# Patient Record
Sex: MALE | Race: WHITE | NOT HISPANIC OR LATINO
[De-identification: names, ages, dates, MRNs, and addresses within clinical notes are randomized per-mention and may not be internally consistent; named-entity substitution may affect disease eponyms.]

---

## 2017-09-07 ENCOUNTER — APPOINTMENT (OUTPATIENT)
Dept: SPINE | Facility: CLINIC | Age: 77
End: 2017-09-07
Payer: MEDICARE

## 2017-09-07 VITALS
DIASTOLIC BLOOD PRESSURE: 68 MMHG | HEIGHT: 66 IN | WEIGHT: 164 LBS | SYSTOLIC BLOOD PRESSURE: 124 MMHG | BODY MASS INDEX: 26.36 KG/M2

## 2017-09-07 DIAGNOSIS — Z86.39 PERSONAL HISTORY OF OTHER ENDOCRINE, NUTRITIONAL AND METABOLIC DISEASE: ICD-10-CM

## 2017-09-07 PROCEDURE — 99213 OFFICE O/P EST LOW 20 MIN: CPT

## 2018-08-02 ENCOUNTER — APPOINTMENT (OUTPATIENT)
Dept: SPINE | Facility: CLINIC | Age: 78
End: 2018-08-02
Payer: MEDICARE

## 2018-08-02 VITALS
BODY MASS INDEX: 26.36 KG/M2 | WEIGHT: 164 LBS | DIASTOLIC BLOOD PRESSURE: 70 MMHG | SYSTOLIC BLOOD PRESSURE: 124 MMHG | HEIGHT: 66 IN

## 2018-08-02 PROCEDURE — 99213 OFFICE O/P EST LOW 20 MIN: CPT

## 2019-08-08 ENCOUNTER — APPOINTMENT (OUTPATIENT)
Dept: SPINE | Facility: CLINIC | Age: 79
End: 2019-08-08

## 2021-05-12 VITALS
RESPIRATION RATE: 16 BRPM | DIASTOLIC BLOOD PRESSURE: 74 MMHG | HEART RATE: 51 BPM | SYSTOLIC BLOOD PRESSURE: 122 MMHG | BODY MASS INDEX: 26.68 KG/M2 | HEIGHT: 66 IN | WEIGHT: 166 LBS | OXYGEN SATURATION: 98 % | TEMPERATURE: 96.1 F

## 2021-06-29 ENCOUNTER — APPOINTMENT (OUTPATIENT)
Dept: INTERNAL MEDICINE | Facility: CLINIC | Age: 81
End: 2021-06-29

## 2021-06-29 ENCOUNTER — NON-APPOINTMENT (OUTPATIENT)
Age: 81
End: 2021-06-29

## 2021-06-29 DIAGNOSIS — M54.5 LOW BACK PAIN: ICD-10-CM

## 2021-06-29 DIAGNOSIS — R07.9 CHEST PAIN, UNSPECIFIED: ICD-10-CM

## 2021-06-29 DIAGNOSIS — Z86.010 PERSONAL HISTORY OF COLONIC POLYPS: ICD-10-CM

## 2021-06-29 DIAGNOSIS — I49.3 VENTRICULAR PREMATURE DEPOLARIZATION: ICD-10-CM

## 2021-06-29 DIAGNOSIS — Z87.891 PERSONAL HISTORY OF NICOTINE DEPENDENCE: ICD-10-CM

## 2021-06-29 RX ORDER — DONEPEZIL HYDROCHLORIDE 10 MG/1
10 TABLET, FILM COATED ORAL
Refills: 0 | Status: ACTIVE | COMMUNITY

## 2021-06-29 RX ORDER — QUETIAPINE FUMARATE 25 MG/1
25 TABLET ORAL
Refills: 0 | Status: ACTIVE | COMMUNITY

## 2021-06-29 RX ORDER — FOLIC ACID 1 MG/1
1 TABLET ORAL
Refills: 0 | Status: ACTIVE | COMMUNITY

## 2021-06-29 RX ORDER — METHYLPREDNISOLONE 4 MG/1
4 TABLET ORAL
Refills: 0 | Status: ACTIVE | COMMUNITY

## 2021-06-29 RX ORDER — MEMANTINE HYDROCHLORIDE 10 MG/1
10 TABLET, FILM COATED ORAL
Refills: 0 | Status: ACTIVE | COMMUNITY

## 2021-06-29 RX ORDER — SILDENAFIL CITRATE 50 MG/1
50 TABLET, FILM COATED ORAL
Refills: 0 | Status: ACTIVE | COMMUNITY

## 2021-06-29 RX ORDER — ERGOCALCIFEROL (VITAMIN D2) 1250 MCG
50000 CAPSULE ORAL
Refills: 0 | Status: ACTIVE | COMMUNITY

## 2021-07-02 ENCOUNTER — RX RENEWAL (OUTPATIENT)
Age: 81
End: 2021-07-02

## 2021-07-03 ENCOUNTER — RX RENEWAL (OUTPATIENT)
Age: 81
End: 2021-07-03

## 2021-07-07 ENCOUNTER — APPOINTMENT (OUTPATIENT)
Dept: INTERNAL MEDICINE | Facility: CLINIC | Age: 81
End: 2021-07-07

## 2021-07-16 ENCOUNTER — APPOINTMENT (OUTPATIENT)
Dept: INTERNAL MEDICINE | Facility: CLINIC | Age: 81
End: 2021-07-16
Payer: MEDICARE

## 2021-07-16 VITALS
HEART RATE: 52 BPM | SYSTOLIC BLOOD PRESSURE: 124 MMHG | OXYGEN SATURATION: 97 % | HEIGHT: 66 IN | BODY MASS INDEX: 24.75 KG/M2 | TEMPERATURE: 96 F | DIASTOLIC BLOOD PRESSURE: 82 MMHG | RESPIRATION RATE: 16 BRPM | WEIGHT: 154 LBS

## 2021-07-16 DIAGNOSIS — M25.561 PAIN IN RIGHT KNEE: ICD-10-CM

## 2021-07-16 DIAGNOSIS — M54.2 CERVICALGIA: ICD-10-CM

## 2021-07-16 PROCEDURE — 20610 DRAIN/INJ JOINT/BURSA W/O US: CPT

## 2021-07-16 PROCEDURE — 99214 OFFICE O/P EST MOD 30 MIN: CPT | Mod: 25

## 2021-07-16 PROCEDURE — 99072 ADDL SUPL MATRL&STAF TM PHE: CPT

## 2021-07-16 RX ORDER — LIDOCAINE HYDROCHLORIDE 10 MG/ML
1 INJECTION, SOLUTION INFILTRATION; PERINEURAL
Qty: 0 | Refills: 0 | Status: COMPLETED | OUTPATIENT
Start: 2021-07-16

## 2021-07-16 RX ORDER — TRIAMCINOLONE ACETONIDE 40 MG/ML
40 SUSPENSION INTRA-ARTERIAL; INTRAMUSCULAR
Qty: 1 | Refills: 0 | Status: COMPLETED | OUTPATIENT
Start: 2021-07-16

## 2021-07-16 RX ADMIN — LIDOCAINE HYDROCHLORIDE 1 %: 10 INJECTION, SOLUTION INFILTRATION; PERINEURAL at 00:00

## 2021-07-16 RX ADMIN — TRIAMCINOLONE ACETONIDE 0 MG/ML: 40 INJECTION, SUSPENSION INTRA-ARTICULAR; INTRAMUSCULAR at 00:00

## 2021-07-18 NOTE — HISTORY OF PRESENT ILLNESS
[FreeTextEntry1] : GHM, f/u, htn, hypothyroid, depression, deafness, bipolar disorder meningioma, leaving for [place] [de-identified] : 81 y/o M presents for f/u, GHM. \par Hx of lipids, htn, hypothyroid, BPH, dementia and deafness. \par Of note, he received both doses of the covid vaccine.\par Pt is leaving for [place] on 7/28, returns Sept 1\par \par  He has hx of right inguinal hernia.  He denies fever, cough, body aches, chills and SOB. \par He denies any recent falls. Routine blood work ordered today.

## 2021-07-18 NOTE — PHYSICAL EXAM
[No Acute Distress] : no acute distress [Well Nourished] : well nourished [Well Developed] : well developed [Well-Appearing] : well-appearing [Normal Sclera/Conjunctiva] : normal sclera/conjunctiva [PERRL] : pupils equal round and reactive to light [EOMI] : extraocular movements intact [Normal Outer Ear/Nose] : the outer ears and nose were normal in appearance [Normal Oropharynx] : the oropharynx was normal [No JVD] : no jugular venous distention [No Lymphadenopathy] : no lymphadenopathy [Supple] : supple [Thyroid Normal, No Nodules] : the thyroid was normal and there were no nodules present [No Respiratory Distress] : no respiratory distress  [No Accessory Muscle Use] : no accessory muscle use [Clear to Auscultation] : lungs were clear to auscultation bilaterally [Normal Rate] : normal rate  [Regular Rhythm] : with a regular rhythm [Normal S1, S2] : normal S1 and S2 [No Murmur] : no murmur heard [No Carotid Bruits] : no carotid bruits [No Abdominal Bruit] : a ~M bruit was not heard ~T in the abdomen [No Varicosities] : no varicosities [Pedal Pulses Present] : the pedal pulses are present [No Edema] : there was no peripheral edema [No Palpable Aorta] : no palpable aorta [No Extremity Clubbing/Cyanosis] : no extremity clubbing/cyanosis [Soft] : abdomen soft [Non Tender] : non-tender [Non-distended] : non-distended [No Masses] : no abdominal mass palpated [No HSM] : no HSM [Normal Bowel Sounds] : normal bowel sounds [Normal Posterior Cervical Nodes] : no posterior cervical lymphadenopathy [Normal Anterior Cervical Nodes] : no anterior cervical lymphadenopathy [No CVA Tenderness] : no CVA  tenderness [No Spinal Tenderness] : no spinal tenderness [No Joint Swelling] : no joint swelling [Grossly Normal Strength/Tone] : grossly normal strength/tone [No Rash] : no rash [Coordination Grossly Intact] : coordination grossly intact [No Focal Deficits] : no focal deficits [Normal Gait] : normal gait [Deep Tendon Reflexes (DTR)] : deep tendon reflexes were 2+ and symmetric [Normal Affect] : the affect was normal [Normal Insight/Judgement] : insight and judgment were intact [de-identified] : Right knee tenderness decreased range of motion crepitation

## 2021-07-18 NOTE — ASSESSMENT
[FreeTextEntry1] : Symptomatic patients : Test for influenza, if positive, treat for influenza and do not continue below. \par 1. Fever plus cough or shortness of breath : Test for RVP and COVID-19.\par 2.Indirect, circumstantial or unclear exposure to COVID-19, or other concerning cases not meeting above criteria: Please call [facility] to discuss testing. \par +++ All above cases must be reported to the [facility] registry. +++\par \par Asymptomatic patients: \par 1. Known first-degree direct-contact exposure to positive COVID-19 patient but asymptomatic: No testing PLUS 14 day self-quarantine. Pt to call if symptoms develop. Report to [facility] Registry.\par 2. No known exposure and asymptomatic, referred from outside healthcare organization: Please call [facility] to discuss testing. \par 3.All other asymptomatic patients with no known exposures: no testing, no exceptions.\par \par

## 2021-07-23 ENCOUNTER — APPOINTMENT (OUTPATIENT)
Dept: INTERNAL MEDICINE | Facility: CLINIC | Age: 81
End: 2021-07-23
Payer: MEDICARE

## 2021-07-23 VITALS
HEIGHT: 66 IN | SYSTOLIC BLOOD PRESSURE: 130 MMHG | WEIGHT: 154 LBS | TEMPERATURE: 96 F | DIASTOLIC BLOOD PRESSURE: 78 MMHG | BODY MASS INDEX: 24.75 KG/M2 | RESPIRATION RATE: 16 BRPM | HEART RATE: 61 BPM | OXYGEN SATURATION: 98 %

## 2021-07-23 DIAGNOSIS — Z71.89 OTHER SPECIFIED COUNSELING: ICD-10-CM

## 2021-07-23 PROCEDURE — 99072 ADDL SUPL MATRL&STAF TM PHE: CPT

## 2021-07-23 PROCEDURE — 99213 OFFICE O/P EST LOW 20 MIN: CPT

## 2021-07-24 PROBLEM — Z71.89 COUNSELED ABOUT COVID-19 VIRUS INFECTION: Status: ACTIVE | Noted: 2021-07-16

## 2021-07-24 NOTE — HISTORY OF PRESENT ILLNESS
[de-identified] : 81 y/o M presents for f/u, [facility]. INquiry about foreign travel--> advising pt to return 72 hrs prior to flight.\par Needs med refill\par Hx of lipids, htn, hypothyroid, BPH, dementia and deafness. \par Of note, he received both doses of the covid vaccine.\par Pt is leaving for [place] on 7/28, returns Sept 1\par \par Pt is in need of Vitamin D, level was at 25 \par \par \par He has hx of right inguinal hernia.  He denies fever, cough, body aches, chills and SOB. \par He denies any recent falls. Routine blood work ordered today. [FreeTextEntry1] : questions about foreign travel, medication refill, GHM, f/u, htn, hypothyroid, depression, deafness, bipolar disorder meningioma, leaving for [place]

## 2021-07-25 ENCOUNTER — APPOINTMENT (OUTPATIENT)
Dept: INTERNAL MEDICINE | Facility: CLINIC | Age: 81
End: 2021-07-25
Payer: MEDICARE

## 2021-07-25 VITALS
DIASTOLIC BLOOD PRESSURE: 76 MMHG | RESPIRATION RATE: 17 BRPM | BODY MASS INDEX: 24.75 KG/M2 | HEART RATE: 51 BPM | SYSTOLIC BLOOD PRESSURE: 138 MMHG | TEMPERATURE: 96.6 F | OXYGEN SATURATION: 97 % | HEIGHT: 66 IN | WEIGHT: 154 LBS

## 2021-07-25 DIAGNOSIS — Z11.52 ENCOUNTER FOR SCREENING FOR COVID-19: ICD-10-CM

## 2021-07-25 PROCEDURE — 99072 ADDL SUPL MATRL&STAF TM PHE: CPT

## 2021-07-25 PROCEDURE — 99213 OFFICE O/P EST LOW 20 MIN: CPT

## 2021-07-25 NOTE — HISTORY OF PRESENT ILLNESS
[Spouse] : spouse [Other: _____] : [unfilled] [FreeTextEntry8] : Traveling to [facility], needs covid test\par Otherwise well, no complaints\par \par \par Spouse

## 2021-07-27 LAB — SARS-COV-2 N GENE NPH QL NAA+PROBE: NOT DETECTED

## 2021-09-08 ENCOUNTER — APPOINTMENT (OUTPATIENT)
Dept: INTERNAL MEDICINE | Facility: CLINIC | Age: 81
End: 2021-09-08
Payer: MEDICARE

## 2021-09-08 VITALS
OXYGEN SATURATION: 98 % | BODY MASS INDEX: 25.23 KG/M2 | TEMPERATURE: 97 F | WEIGHT: 157 LBS | DIASTOLIC BLOOD PRESSURE: 68 MMHG | HEIGHT: 66 IN | HEART RATE: 68 BPM | SYSTOLIC BLOOD PRESSURE: 108 MMHG | RESPIRATION RATE: 18 BRPM

## 2021-09-08 PROCEDURE — 90662 IIV NO PRSV INCREASED AG IM: CPT

## 2021-09-08 PROCEDURE — 99214 OFFICE O/P EST MOD 30 MIN: CPT | Mod: 25

## 2021-09-08 PROCEDURE — G0008: CPT

## 2021-09-10 ENCOUNTER — RX RENEWAL (OUTPATIENT)
Age: 81
End: 2021-09-10

## 2021-09-12 NOTE — HISTORY OF PRESENT ILLNESS
[FreeTextEntry1] : Follow up, [facility] hx of htn, hypothyroid, BPH, dementia and deafness.  [de-identified] : 81 y/o M presents for f/u, GHM.\par \par Hx of lipids, htn, hypothyroid, BPH, dementia and deafness. \par Of note, he received both doses of the covid vaccine.\par Patient is advised to get a booster shot for his covid vaccine in December of 2021/ \par \par Patient is referred to a general surgeon for hernias Dr. Singletary. Patient is complaining of left lower quadrant pain. \par \par Pt is in need of Vitamin D, level was at 25 \par \par \par He has hx of right inguinal hernia.  He denies fever, cough, body aches, chills and SOB. \par He denies any recent falls. Routine blood work ordered today.

## 2021-09-23 ENCOUNTER — APPOINTMENT (OUTPATIENT)
Dept: SURGERY | Facility: CLINIC | Age: 81
End: 2021-09-23
Payer: MEDICARE

## 2021-09-23 VITALS
HEART RATE: 54 BPM | DIASTOLIC BLOOD PRESSURE: 69 MMHG | RESPIRATION RATE: 18 BRPM | BODY MASS INDEX: 24.27 KG/M2 | HEIGHT: 66 IN | SYSTOLIC BLOOD PRESSURE: 129 MMHG | TEMPERATURE: 97.4 F | OXYGEN SATURATION: 96 % | WEIGHT: 151 LBS

## 2021-09-23 DIAGNOSIS — K40.90 UNILATERAL INGUINAL HERNIA, W/OUT OBSTRUCTION OR GANGRENE, NOT SPECIFIED AS RECURRENT: ICD-10-CM

## 2021-09-23 PROCEDURE — 99203 OFFICE O/P NEW LOW 30 MIN: CPT

## 2021-09-23 NOTE — PHYSICAL EXAM
[Normal Breath Sounds] : Normal breath sounds [Alert] : alert [Oriented to Person] : oriented to person [Oriented to Place] : oriented to place [Oriented to Time] : oriented to time [Calm] : calm [de-identified] : well appearing  [de-identified] : reducible left inguinal hernia

## 2021-09-23 NOTE — HISTORY OF PRESENT ILLNESS
[de-identified] : 81 yo male who presents to the office for evaluation of  left inguinal hernia. He states that he has been feeling some discomfort in left groin and came in for evaluation. He denied any obstructive symptoms associated with it.

## 2021-09-23 NOTE — ASSESSMENT
[FreeTextEntry1] : 81 yo male with reducible left inguinal hernia. \par Operative options(open vs robotic approach) was discussed as well as watchful waiting. He will like to proceed with watchful waiting. He and his wife was instructed to come back if had worsening in his symptoms.  All questions were answered in detail and the patient expressed full understanding.

## 2021-11-22 ENCOUNTER — APPOINTMENT (OUTPATIENT)
Dept: INTERNAL MEDICINE | Facility: CLINIC | Age: 81
End: 2021-11-22

## 2021-12-04 ENCOUNTER — RX RENEWAL (OUTPATIENT)
Age: 81
End: 2021-12-04

## 2021-12-15 ENCOUNTER — APPOINTMENT (OUTPATIENT)
Dept: INTERNAL MEDICINE | Facility: CLINIC | Age: 81
End: 2021-12-15
Payer: MEDICARE

## 2021-12-15 VITALS
HEART RATE: 60 BPM | BODY MASS INDEX: 25.07 KG/M2 | TEMPERATURE: 96.3 F | SYSTOLIC BLOOD PRESSURE: 116 MMHG | DIASTOLIC BLOOD PRESSURE: 60 MMHG | WEIGHT: 156 LBS | OXYGEN SATURATION: 98 % | RESPIRATION RATE: 18 BRPM | HEIGHT: 66 IN

## 2021-12-15 DIAGNOSIS — H81.10 BENIGN PAROXYSMAL VERTIGO, UNSPECIFIED EAR: ICD-10-CM

## 2021-12-15 DIAGNOSIS — H69.90 UNSPECIFIED EUSTACHIAN TUBE DISORDER, UNSPECIFIED EAR: ICD-10-CM

## 2021-12-15 PROCEDURE — 99213 OFFICE O/P EST LOW 20 MIN: CPT

## 2021-12-15 RX ORDER — MECLIZINE HYDROCHLORIDE 25 MG/1
25 TABLET ORAL 3 TIMES DAILY
Qty: 21 | Refills: 1 | Status: ACTIVE | COMMUNITY
Start: 2021-12-15 | End: 1900-01-01

## 2021-12-16 PROBLEM — H69.90 ET (EUSTACHIAN TUBE DISORDER): Status: ACTIVE | Noted: 2021-12-16

## 2021-12-16 PROBLEM — H81.10 BENIGN POSITIONAL VERTIGO: Status: ACTIVE | Noted: 2021-12-15

## 2021-12-16 NOTE — PHYSICAL EXAM
[No Acute Distress] : no acute distress [Well Nourished] : well nourished [Well Developed] : well developed [Well-Appearing] : well-appearing [Normal Sclera/Conjunctiva] : normal sclera/conjunctiva [PERRL] : pupils equal round and reactive to light [EOMI] : extraocular movements intact [Normal Outer Ear/Nose] : the outer ears and nose were normal in appearance [Normal Oropharynx] : the oropharynx was normal [No JVD] : no jugular venous distention [No Lymphadenopathy] : no lymphadenopathy [Supple] : supple [Thyroid Normal, No Nodules] : the thyroid was normal and there were no nodules present [No Respiratory Distress] : no respiratory distress  [No Accessory Muscle Use] : no accessory muscle use [Clear to Auscultation] : lungs were clear to auscultation bilaterally [Normal Rate] : normal rate  [Regular Rhythm] : with a regular rhythm [Normal S1, S2] : normal S1 and S2 [No Murmur] : no murmur heard [No Carotid Bruits] : no carotid bruits [No Abdominal Bruit] : a ~M bruit was not heard ~T in the abdomen [No Varicosities] : no varicosities [Pedal Pulses Present] : the pedal pulses are present [No Edema] : there was no peripheral edema [No Palpable Aorta] : no palpable aorta [No Extremity Clubbing/Cyanosis] : no extremity clubbing/cyanosis [Soft] : abdomen soft [Non Tender] : non-tender [Non-distended] : non-distended [No Masses] : no abdominal mass palpated [No HSM] : no HSM [Normal Bowel Sounds] : normal bowel sounds [Normal Posterior Cervical Nodes] : no posterior cervical lymphadenopathy [Normal Anterior Cervical Nodes] : no anterior cervical lymphadenopathy [No CVA Tenderness] : no CVA  tenderness [No Spinal Tenderness] : no spinal tenderness [No Joint Swelling] : no joint swelling [Grossly Normal Strength/Tone] : grossly normal strength/tone [No Rash] : no rash [Coordination Grossly Intact] : coordination grossly intact [No Focal Deficits] : no focal deficits [Normal Gait] : normal gait [Deep Tendon Reflexes (DTR)] : deep tendon reflexes were 2+ and symmetric [Normal Affect] : the affect was normal [Normal Insight/Judgement] : insight and judgment were intact [de-identified] : Fluid in both ears and congested.  [de-identified] : Hallpike maneuver negative

## 2021-12-16 NOTE — HISTORY OF PRESENT ILLNESS
[FreeTextEntry1] : Follow up, [facility] hx of htn, hypothyroid, BPH, dementia and deafness. Patient is here today because this morning he felt dizzy.  Patient admits to having a URI this week [de-identified] : 80 y/o M presents for f/u, GHM.\par Hx of lipids, htn, hypothyroid, BPH, dementia and deafness. \par \par Patient is here today because this morning he felt dizzy \par Will be given meclizine 25mg and amoxicillin for the URI 500mg three times a day for a week. \par Of note, he received both doses of the covid vaccine.\par Otherwise patient is stable. \par \par Voices no further complaints.\par ROS as documented below.\par He denies fever, cough, body aches, chills and SOB.

## 2022-02-05 ENCOUNTER — RX RENEWAL (OUTPATIENT)
Age: 82
End: 2022-02-05

## 2022-03-10 ENCOUNTER — RX RENEWAL (OUTPATIENT)
Age: 82
End: 2022-03-10

## 2022-05-21 ENCOUNTER — RX RENEWAL (OUTPATIENT)
Age: 82
End: 2022-05-21

## 2022-07-19 ENCOUNTER — RX RENEWAL (OUTPATIENT)
Age: 82
End: 2022-07-19

## 2022-07-20 ENCOUNTER — APPOINTMENT (OUTPATIENT)
Dept: INTERNAL MEDICINE | Facility: CLINIC | Age: 82
End: 2022-07-20

## 2022-07-22 ENCOUNTER — NON-APPOINTMENT (OUTPATIENT)
Age: 82
End: 2022-07-22

## 2022-07-22 ENCOUNTER — APPOINTMENT (OUTPATIENT)
Dept: INTERNAL MEDICINE | Facility: CLINIC | Age: 82
End: 2022-07-22

## 2022-07-22 VITALS
HEIGHT: 66 IN | DIASTOLIC BLOOD PRESSURE: 80 MMHG | TEMPERATURE: 97.6 F | HEART RATE: 64 BPM | RESPIRATION RATE: 18 BRPM | BODY MASS INDEX: 26.52 KG/M2 | OXYGEN SATURATION: 97 % | SYSTOLIC BLOOD PRESSURE: 116 MMHG | WEIGHT: 165 LBS

## 2022-07-22 DIAGNOSIS — Z00.00 ENCOUNTER FOR GENERAL ADULT MEDICAL EXAMINATION W/OUT ABNORMAL FINDINGS: ICD-10-CM

## 2022-07-22 PROCEDURE — G0439: CPT

## 2022-07-22 PROCEDURE — 93000 ELECTROCARDIOGRAM COMPLETE: CPT | Mod: 59

## 2022-07-22 PROCEDURE — 99497 ADVNCD CARE PLAN 30 MIN: CPT

## 2022-07-22 PROCEDURE — G0444 DEPRESSION SCREEN ANNUAL: CPT | Mod: 59

## 2022-07-22 PROCEDURE — 36415 COLL VENOUS BLD VENIPUNCTURE: CPT

## 2022-07-22 RX ORDER — NIRMATRELVIR AND RITONAVIR 300-100 MG
20 X 150 MG & KIT ORAL
Qty: 1 | Refills: 0 | Status: ACTIVE | COMMUNITY
Start: 2022-07-22 | End: 1900-01-01

## 2022-07-24 LAB
25(OH)D3 SERPL-MCNC: 26.7 NG/ML
ALBUMIN SERPL ELPH-MCNC: 4.6 G/DL
ALP BLD-CCNC: 57 U/L
ALT SERPL-CCNC: 7 U/L
ANION GAP SERPL CALC-SCNC: 11 MMOL/L
APPEARANCE: CLEAR
AST SERPL-CCNC: 18 U/L
BASOPHILS # BLD AUTO: 0.13 K/UL
BASOPHILS NFR BLD AUTO: 1.5 %
BILIRUB SERPL-MCNC: 0.6 MG/DL
BILIRUBIN URINE: NEGATIVE
BLOOD URINE: NEGATIVE
BUN SERPL-MCNC: 15 MG/DL
CALCIUM SERPL-MCNC: 9.5 MG/DL
CHLORIDE SERPL-SCNC: 104 MMOL/L
CHOLEST SERPL-MCNC: 216 MG/DL
CO2 SERPL-SCNC: 26 MMOL/L
COLOR: YELLOW
CREAT SERPL-MCNC: 1.09 MG/DL
EGFR: 68 ML/MIN/1.73M2
EOSINOPHIL # BLD AUTO: 0.9 K/UL
EOSINOPHIL NFR BLD AUTO: 10.5 %
ESTIMATED AVERAGE GLUCOSE: 103 MG/DL
GGT SERPL-CCNC: 23 U/L
GLUCOSE QUALITATIVE U: NEGATIVE
GLUCOSE SERPL-MCNC: 81 MG/DL
HBA1C MFR BLD HPLC: 5.2 %
HCT VFR BLD CALC: 49.2 %
HDLC SERPL-MCNC: 59 MG/DL
HGB BLD-MCNC: 15.5 G/DL
IMM GRANULOCYTES NFR BLD AUTO: 0.2 %
KETONES URINE: NEGATIVE
LDLC SERPL CALC-MCNC: 133 MG/DL
LEUKOCYTE ESTERASE URINE: NEGATIVE
LYMPHOCYTES # BLD AUTO: 2.13 K/UL
LYMPHOCYTES NFR BLD AUTO: 24.9 %
MAN DIFF?: NORMAL
MCHC RBC-ENTMCNC: 29.9 PG
MCHC RBC-ENTMCNC: 31.5 GM/DL
MCV RBC AUTO: 95 FL
MONOCYTES # BLD AUTO: 0.71 K/UL
MONOCYTES NFR BLD AUTO: 8.3 %
NEUTROPHILS # BLD AUTO: 4.65 K/UL
NEUTROPHILS NFR BLD AUTO: 54.6 %
NITRITE URINE: NEGATIVE
NONHDLC SERPL-MCNC: 157 MG/DL
PH URINE: 6
PLATELET # BLD AUTO: 271 K/UL
POTASSIUM SERPL-SCNC: 4.5 MMOL/L
PROT SERPL-MCNC: 7 G/DL
PROTEIN URINE: NORMAL
PSA SERPL-MCNC: 3.9 NG/ML
RBC # BLD: 5.18 M/UL
RBC # FLD: 13.2 %
SODIUM SERPL-SCNC: 141 MMOL/L
SPECIFIC GRAVITY URINE: 1.02
TRIGL SERPL-MCNC: 118 MG/DL
TSH SERPL-ACNC: 0.72 UIU/ML
UROBILINOGEN URINE: NORMAL
WBC # FLD AUTO: 8.54 K/UL

## 2022-07-24 NOTE — ASSESSMENT
[FreeTextEntry1] : Medical Annual wellness visit completed:\par HRA completed and reviewed with patient\par Medical, family, surgical history reviewed with patient and updated\par List of current providers r/w patient and updated\par Vitals, BMI reviewed and discussed along with healthy BMI goals. Dietary counseling x 15 minutes provided\par Depression PHQ 9 completed and reviewed \par Annual safety assessment reviewed\par discussed advanced directives\par smoking cessation counseling provided\par Established routine screening and immunization schedules\par \par VACCINATION & OTHER TX RECOMMENDATIONS\par \par ASA preventative therapy\par Calcium/Vitamin D supplementation\par  \par Dietary counseling, nutrition referral\par risks vs. benefits d/w patient. routine vaccination and vaccination schedules and recommendation d/w patient\par \par Vaccines recommended: \par * pneumovax (once after 65) & prevnar\par * annual Influenza vaccine\par * Hep B vaccines\par * zostavax\par * Tdap\par \par Colorectal screening recommended; screening colonoscopy q10yr, flex sig q5yr, annual fecal occult testing\par BMD recommended biennially for osteoporosis screening\par Glaucoma screening recommended, annual optho evals\par Cardiovascular screening and blood tests recommended and discussed w/ patient, cholesterol screening and dietary counseling\par AAA recommended x 1\par \par \par Met with LAURA MANZANO, who was willing to discuss advance care planning. \par Our advance care planning conversation included a discussion about:\par 1. The value and importance of advance care planning.\par 2. Experiences with loved ones who have been seriously ill or have .\par 3. Exploration of personal, cultural, or spiritual beliefs that might influence medical decisions.\par 4. Exploration of goals of care in the event of a sudden injury or illness.\par 5. Identification of a health care agent.\par 6. Review and update, or completion of, an advance directive.\par Start time: 10 End time: 1015\par \par diet and exercise weight loss.  Low-salt low-fat ADA diet/ htn- Discussed diabetes physiology\par - Discussed importance of monitoring blood glucose levels\par - Encouraged a low fat/low cholesterol diet\par - Discussed symptoms of hyperglycemia and hypoglycemia\par - Discussed ADA glucose goals\par - Discussed  HGB A1c and the effects of blood glucose on the level\par - Discussed Healthy eating, avoidance of concentrated sweets, and to include vegetables by at least 2 meals a day\par - Discussed regular exercise\par - Discussed importance of follow up physician visits Limit intake of Sodium (Salt) to less than 2 grams a day to prevent fluid retention-swelling or worsening of symptoms. The importance of keeping the blood pressure at or below 130/80 to prevent stroke, heart attacks, kidney failure, blindness, and loss of limbs was  low chol diet. Avoid fried foods, red meat, butter, eggs, hard cheeses. Use canola or olive oil preferred. ::  was established in which goals would be set, monitoring would be done, and problem solving would also be addressed. The patient would be assisted using behavior change techniques, such as self-help and counseling through behavioral modification: Problem solving using hypnosis and positive medical reinforcement to achieve agreed-upon goals.\par \par Symptomatic patients : Test for influenza, if positive, treat for influenza and do not continue below. \par 1. Fever plus cough or shortness of breath : Test for RVP and COVID-19.\par 2.Indirect, circumstantial or unclear exposure to COVID-19, or other concerning cases not meeting above criteria: Please call [facility] to discuss testing. \par +++ All above cases must be reported to the [facility] registry. +++\par \par Asymptomatic patients: \par 1. Known first-degree direct-contact exposure to positive COVID-19 patient but asymptomatic: No testing PLUS 14 day self-quarantine. Pt to call if symptoms develop. Report to [facility] Registry.\par 2. No known exposure and asymptomatic, referred from outside healthcare organization: Please call [facility] to discuss testing. \par 3.All other asymptomatic patients with no known exposures: no testing, no exceptions.\par \par \par

## 2022-07-24 NOTE — HISTORY OF PRESENT ILLNESS
[FreeTextEntry1] : annual wellness, GHM, f/u, htn, hypothyroid, depression, deafness, bipolar disorder meningioma\par cc: mild sore throat, congestion [de-identified] : 82 y/o M presents for f/u, [facility] annual wellness\par Hx of lipids, htn, hypothyroid, BPH, dementia and deafness. \par cc: mild sore throat, congestion\par \par Of note, he received both doses of the covid vaccine.\par Otherwise patient is stable. \par Voices no further complaints.\par ROS as documented below.\par He denies fever, cough, body aches, chills and SOB.

## 2022-07-24 NOTE — HEALTH RISK ASSESSMENT
[Former] : Former [1 or 2 (0 pts)] : 1 or 2 (0 points) [Never (0 pts)] : Never (0 points) [No] : In the past 12 months have you used drugs other than those required for medical reasons? No [No falls in past year] : Patient reported no falls in the past year [I have developed a follow-up plan documented below in the note.] : I have developed a follow-up plan documented below in the note. [Patient declined colonoscopy] : Patient declined colonoscopy [Learning/Retaining New Information] : difficulty learning/retaining new information [Handling Complex Tasks] : difficulty handling complex tasks [Reasoning] : difficulty with reasoning [Spatial Ability and Orientation] : difficulty with spatial ability and orientation [None] : None [With Significant Other] : lives with significant other [Retired] : retired [High School] : high school [] :  [Feels Safe at Home] : Feels safe at home [Fully functional (bathing, dressing, toileting, transferring, walking, feeding)] : Fully functional (bathing, dressing, toileting, transferring, walking, feeding) [Fully functional (using the telephone, shopping, preparing meals, housekeeping, doing laundry, using] : Fully functional and needs no help or supervision to perform IADLs (using the telephone, shopping, preparing meals, housekeeping, doing laundry, using transportation, managing medications and managing finances) [Reports normal functional visual acuity (ie: able to read med bottle)] : Reports normal functional visual acuity [Smoke Detector] : smoke detector [Carbon Monoxide Detector] : carbon monoxide detector [Safety elements used in home] : safety elements used in home [Seat Belt] :  uses seat belt [Sunscreen] : uses sunscreen [With Patient/Caregiver] : , with patient/caregiver [Designated Healthcare Proxy] : Designated healthcare proxy [Name: ___] : Health Care Proxy's Name: [unfilled]  [Relationship: ___] : Relationship: [unfilled] [Last Verification Date: ___] : Last Verification Date: [unfilled] [I will adhere to the patient's wishes.] : I will adhere to the patient's wishes. [Time Spent: ___ minutes] : Time Spent: [unfilled] minutes [Fair] :  ~his/her~ mood as fair [1] : 2) Feeling down, depressed, or hopeless for several days (1) [PHQ-2 Positive] : PHQ-2 Positive [Several Days (1)] : 6.) Feeling bad about yourself, or that you are a failure, or have let yourself or your family down? Several days [Not at All (0)] : 8.) Moving or speaking so slowly that other people could have noticed, or the opposite, moving or speaking faster than usual? Not at all [Mild] : severity of depression is mild [PHQ-9 Positive] : PHQ-9 Positive [Reviewed no changes] : Reviewed, no changes [Aggressive treatment] : aggressive treatment [YearQuit] : 1989 [de-identified] : Walking [de-identified] : Standard [[id number]] : 2 [[id number]] : 3 [Change in mental status noted] : No change in mental status noted [Language] : denies difficulty with language [Behavior] : denies difficulty with behavior [Sexually Active] : not sexually active [High Risk Behavior] : no high risk behavior [Reports changes in hearing] : Reports no changes in hearing [Reports changes in vision] : Reports no changes in vision [Reports changes in dental health] : Reports no changes in dental health [Guns at Home] : no guns at home [Travel to Developing Areas] : does not  travel to developing areas [TB Exposure] : is not being exposed to tuberculosis [Caregiver Concerns] : does not have caregiver concerns [AdvancecareDate] : 07/2022

## 2022-07-24 NOTE — COUNSELING
[Fall prevention counseling provided] : Fall prevention counseling provided [Adequate lighting] : Adequate lighting [No throw rugs] : No throw rugs [Use proper foot wear] : Use proper foot wear [Use recommended devices] : Use recommended devices [Behavioral health counseling provided] : Behavioral health counseling provided [Sleep ___ hours/day] : Sleep [unfilled] hours/day [Engage in a relaxing activity] : Engage in a relaxing activity [Plan in advance] : Plan in advance [None] : None [Good understanding] : Patient has a good understanding of lifestyle changes and steps needed to achieve self management goal [de-identified] : Symptomatic patients : Test for influenza, if positive, treat for influenza and do not continue below. \par 1. Fever plus cough or shortness of breath : Test for RVP and COVID-19.\par 2.Indirect, circumstantial or unclear exposure to COVID-19, or other concerning cases not meeting above criteria: Please call [facility] to discuss testing. \par +++ All above cases must be reported to the [facility] registry. +++\par \par Asymptomatic patients: \par 1. Known first-degree direct-contact exposure to positive COVID-19 patient but asymptomatic: No testing PLUS 14 day self-quarantine. Pt to call if symptoms develop. Report to [facility] Registry.\par 2. No known exposure and asymptomatic, referred from outside healthcare organization: Please call [facility] to discuss testing. \par 3.All other asymptomatic patients with no known exposures: no testing, no exceptions.\par \par diet and exercise weight loss.  Low-salt low-fat ADA diet/ htn- Discussed diabetes physiology\par - Discussed importance of monitoring blood glucose levels\par - Encouraged a low fat/low cholesterol diet\par - Discussed symptoms of hyperglycemia and hypoglycemia\par - Discussed ADA glucose goals\par - Discussed  HGB A1c and the effects of blood glucose on the level\par - Discussed Healthy eating, avoidance of concentrated sweets, and to include vegetables by at least 2 meals a day\par - Discussed regular exercise\par - Discussed importance of follow up physician visits Limit intake of Sodium (Salt) to less than 2 grams a day to prevent fluid retention-swelling or worsening of symptoms. The importance of keeping the blood pressure at or below 130/80 to prevent stroke, heart attacks, kidney failure, blindness, and loss of limbs was  low chol diet. Avoid fried foods, red meat, butter, eggs, hard cheeses. Use canola or olive oil preferred. ::  was established in which goals would be set, monitoring would be done, and problem solving would also be addressed. The patient would be assisted using behavior change techniques, such as self-help and counseling through behavioral modification: Problem solving using hypnosis and positive medical reinforcement to achieve agreed-upon goals.\par Medical Annual wellness visit completed:\par HRA completed and reviewed with patient\par Medical, family, surgical history reviewed with patient and updated\par List of current providers r/w patient and updated\par Vitals, BMI reviewed and discussed along with healthy BMI goals. Dietary counseling x 15 minutes provided\par Depression PHQ 9 completed and reviewed \par Annual safety assessment reviewed\par discussed advanced directives\par smoking cessation counseling provided\par Established routine screening and immunization schedules\par Medical Annual wellness visit completed:\par HRA completed and reviewed with patient\par Medical, family, surgical history reviewed with patient and updated\par List of current providers r/w patient and updated\par Vitals, BMI reviewed and discussed along with healthy BMI goals. Dietary counseling x 15 minutes provided\par Depression PHQ 9 completed and reviewed \par Annual safety assessment reviewed\par discussed advanced directives\par smoking cessation counseling provided\par Established routine screening and immunization schedules\par \par VACCINATION & OTHER TX RECOMMENDATIONS\par \par ASA preventative therapy\par Calcium/Vitamin D supplementation\par  \par Dietary counseling, nutrition referral\par risks vs. benefits d/w patient. routine vaccination and vaccination schedules and recommendation d/w patient\par \par Vaccines recommended: \par * pneumovax (once after 65) & prevnar\par * annual Influenza vaccine\par * Hep B vaccines\par * zostavax\par * Tdap\par \par Colorectal screening recommended; screening colonoscopy q10yr, flex sig q5yr, annual fecal occult testing\par BMD recommended biennially for osteoporosis screening\par Glaucoma screening recommended, annual optho evals\par Cardiovascular screening and blood tests recommended and discussed w/ patient, cholesterol screening and dietary counseling\par AAA recommended x 1\par \par

## 2022-07-24 NOTE — PHYSICAL EXAM
[No Acute Distress] : no acute distress [Well Nourished] : well nourished [Well Developed] : well developed [Well-Appearing] : well-appearing [Normal Sclera/Conjunctiva] : normal sclera/conjunctiva [PERRL] : pupils equal round and reactive to light [EOMI] : extraocular movements intact [Normal Outer Ear/Nose] : the outer ears and nose were normal in appearance [No JVD] : no jugular venous distention [No Lymphadenopathy] : no lymphadenopathy [Supple] : supple [Thyroid Normal, No Nodules] : the thyroid was normal and there were no nodules present [No Respiratory Distress] : no respiratory distress  [No Accessory Muscle Use] : no accessory muscle use [Clear to Auscultation] : lungs were clear to auscultation bilaterally [Normal Rate] : normal rate  [Regular Rhythm] : with a regular rhythm [Normal S1, S2] : normal S1 and S2 [No Murmur] : no murmur heard [No Carotid Bruits] : no carotid bruits [No Abdominal Bruit] : a ~M bruit was not heard ~T in the abdomen [No Varicosities] : no varicosities [Pedal Pulses Present] : the pedal pulses are present [No Edema] : there was no peripheral edema [No Palpable Aorta] : no palpable aorta [No Extremity Clubbing/Cyanosis] : no extremity clubbing/cyanosis [Soft] : abdomen soft [Non Tender] : non-tender [Non-distended] : non-distended [No Masses] : no abdominal mass palpated [No HSM] : no HSM [Normal Bowel Sounds] : normal bowel sounds [Normal Posterior Cervical Nodes] : no posterior cervical lymphadenopathy [Normal Anterior Cervical Nodes] : no anterior cervical lymphadenopathy [No CVA Tenderness] : no CVA  tenderness [No Spinal Tenderness] : no spinal tenderness [No Joint Swelling] : no joint swelling [Grossly Normal Strength/Tone] : grossly normal strength/tone [No Rash] : no rash [Coordination Grossly Intact] : coordination grossly intact [No Focal Deficits] : no focal deficits [Normal Gait] : normal gait [Deep Tendon Reflexes (DTR)] : deep tendon reflexes were 2+ and symmetric [Normal Affect] : the affect was normal [Normal Insight/Judgement] : insight and judgment were intact [Alert and Oriented x3] : oriented to person, place, and time [de-identified] : mild pharyngeal erythema, severe deafness [de-identified] : Back pain

## 2022-07-25 ENCOUNTER — NON-APPOINTMENT (OUTPATIENT)
Age: 82
End: 2022-07-25

## 2022-10-10 ENCOUNTER — RX RENEWAL (OUTPATIENT)
Age: 82
End: 2022-10-10

## 2022-12-02 ENCOUNTER — RX RENEWAL (OUTPATIENT)
Age: 82
End: 2022-12-02

## 2022-12-02 RX ORDER — DICLOFENAC SODIUM 100 MG/1
100 TABLET, FILM COATED, EXTENDED RELEASE ORAL
Qty: 30 | Refills: 3 | Status: ACTIVE | COMMUNITY
Start: 2021-07-23 | End: 1900-01-01

## 2022-12-02 RX ORDER — ACETAMINOPHEN AND CODEINE 300; 30 MG/1; MG/1
300-30 TABLET ORAL
Qty: 60 | Refills: 0 | Status: ACTIVE | COMMUNITY
Start: 2022-12-02 | End: 1900-01-01

## 2022-12-05 ENCOUNTER — APPOINTMENT (OUTPATIENT)
Dept: INTERNAL MEDICINE | Facility: CLINIC | Age: 82
End: 2022-12-05

## 2022-12-05 VITALS
DIASTOLIC BLOOD PRESSURE: 70 MMHG | OXYGEN SATURATION: 96 % | SYSTOLIC BLOOD PRESSURE: 130 MMHG | BODY MASS INDEX: 26.52 KG/M2 | HEIGHT: 66 IN | TEMPERATURE: 97.5 F | RESPIRATION RATE: 18 BRPM | WEIGHT: 165 LBS | HEART RATE: 55 BPM

## 2022-12-05 DIAGNOSIS — E66.3 OVERWEIGHT: ICD-10-CM

## 2022-12-05 DIAGNOSIS — R19.8 OTHER SPECIFIED SYMPTOMS AND SIGNS INVOLVING THE DIGESTIVE SYSTEM AND ABDOMEN: ICD-10-CM

## 2022-12-05 PROCEDURE — 99214 OFFICE O/P EST MOD 30 MIN: CPT

## 2022-12-05 RX ORDER — CYCLOBENZAPRINE HYDROCHLORIDE 10 MG/1
10 TABLET, FILM COATED ORAL
Qty: 30 | Refills: 3 | Status: ACTIVE | COMMUNITY

## 2022-12-05 RX ORDER — ACETAMINOPHEN AND CODEINE 300; 30 MG/1; MG/1
300-30 TABLET ORAL
Qty: 60 | Refills: 0 | Status: ACTIVE | COMMUNITY
Start: 2022-07-22 | End: 1900-01-01

## 2022-12-06 PROBLEM — R19.8 CHANGE IN BOWEL FUNCTION: Status: ACTIVE | Noted: 2022-12-05

## 2022-12-06 NOTE — REVIEW OF SYSTEMS
[Constipation] : constipation [Negative] : Heme/Lymph [Sore Throat] : sore throat [FreeTextEntry4] : Deafness

## 2022-12-06 NOTE — END OF VISIT
[FreeTextEntry4] : I Elvin Lees, am scribing for and in the presence of Dr. Hicks, the following sections of:  HISTORY OF PRESENT ILLNESS, PAST MEDICAL/FAMILY/SOCIAL HISTORY, ROS, VITALS, PE, DISPOSITION

## 2022-12-06 NOTE — HISTORY OF PRESENT ILLNESS
[FreeTextEntry1] : annual wellness, GHM, f/u, htn, hypothyroid, depression, deafness, bipolar disorder meningioma\par cc: constipation, sore throat. [de-identified] : 82 y/o M presents for f/u, [facility] annual wellness\par Hx of lipids, htn, hypothyroid, BPH, dementia and deafness. \par cc: constipation, onset x 3-5 days, of note- on tylenol #3. Denies abd pain, fever, body aches and chills. No urinary complaints.\par \par Of note, he received both doses of the covid vaccine.\par Otherwise patient is stable. \par Voices no further complaints.\par ROS as documented below.\par He denies fever, cough, body aches, chills and SOB.

## 2022-12-06 NOTE — COUNSELING
[Fall prevention counseling provided] : Fall prevention counseling provided [Adequate lighting] : Adequate lighting [No throw rugs] : No throw rugs [Use proper foot wear] : Use proper foot wear [Use recommended devices] : Use recommended devices [Behavioral health counseling provided] : Behavioral health counseling provided [Sleep ___ hours/day] : Sleep [unfilled] hours/day [Engage in a relaxing activity] : Engage in a relaxing activity [Plan in advance] : Plan in advance [de-identified] : Symptomatic patients : Test for influenza, if positive, treat for influenza and do not continue below. \par 1. Fever plus cough or shortness of breath : Test for RVP and COVID-19.\par 2.Indirect, circumstantial or unclear exposure to COVID-19, or other concerning cases not meeting above criteria: Please call [facility] to discuss testing. \par +++ All above cases must be reported to the [facility] registry. +++\par \par Asymptomatic patients: \par 1. Known first-degree direct-contact exposure to positive COVID-19 patient but asymptomatic: No testing PLUS 14 day self-quarantine. Pt to call if symptoms develop. Report to [facility] Registry.\par 2. No known exposure and asymptomatic, referred from outside healthcare organization: Please call [facility] to discuss testing. \par 3.All other asymptomatic patients with no known exposures: no testing, no exceptions.\par \par Bp stable, continue with medications, dash diet, exercise, and dietary management.Continue to check home Bp’s.\par \par low chol diet. Avoid fried foods, red meat, butter, eggs, hard cheeses. Use canola or olive oil preferred.\par \par  - Encouraged a low fat/low cholesterol diet\par  - Discussed Healthy eating, avoidance of concentrated sweets, and to include vegetables by at least 3 meals a day\par  - encouraged low glycemic fruits, grains and vegetables and a diet high in plant protein\par  - Discussed regular exercise\par  - Discussed importance of follow up physician visits\par \par diet and exercise weight loss.  Low-salt low-fat ADA diet/ htn- Discussed diabetes physiology\par - Discussed importance of monitoring blood glucose levels\par - Encouraged a low fat/low cholesterol diet\par - Discussed symptoms of hyperglycemia and hypoglycemia\par - Discussed ADA glucose goals\par - Discussed  HGB A1c and the effects of blood glucose on the level\par - Discussed Healthy eating, avoidance of concentrated sweets, and to include vegetables by at least 2 meals a day\par - Discussed regular exercise\par - Discussed importance of follow up physician visits Limit intake of Sodium (Salt) to less than 2 grams a day to prevent fluid retention-swelling or worsening of symptoms. The importance of keeping the blood pressure at or below 130/80 to prevent stroke, heart attacks, kidney failure, blindness, and loss of limbs was  low chol diet. Avoid fried foods, red meat, butter, eggs, hard cheeses. Use canola or olive oil preferred. ::  was established in which goals would be set, monitoring would be done, and problem solving would also be addressed. The patient would be assisted using behavior change techniques, such as self-help and counseling through behavioral modification: Problem solving using hypnosis and positive medical reinforcement to achieve agreed-upon goals.\par Recommended high-fiber diet.  Colace Metamucil probiotics MiraLAX and colonoscopy.  Face-to-face counseling 15 minutes.

## 2022-12-06 NOTE — PHYSICAL EXAM
[No Acute Distress] : no acute distress [Well Nourished] : well nourished [Well Developed] : well developed [Well-Appearing] : well-appearing [Normal Sclera/Conjunctiva] : normal sclera/conjunctiva [PERRL] : pupils equal round and reactive to light [EOMI] : extraocular movements intact [Normal Outer Ear/Nose] : the outer ears and nose were normal in appearance [Normal Oropharynx] : the oropharynx was normal [No JVD] : no jugular venous distention [No Lymphadenopathy] : no lymphadenopathy [Supple] : supple [Thyroid Normal, No Nodules] : the thyroid was normal and there were no nodules present [No Respiratory Distress] : no respiratory distress  [No Accessory Muscle Use] : no accessory muscle use [Clear to Auscultation] : lungs were clear to auscultation bilaterally [Normal Rate] : normal rate  [Regular Rhythm] : with a regular rhythm [Normal S1, S2] : normal S1 and S2 [No Murmur] : no murmur heard [No Carotid Bruits] : no carotid bruits [No Abdominal Bruit] : a ~M bruit was not heard ~T in the abdomen [No Varicosities] : no varicosities [Pedal Pulses Present] : the pedal pulses are present [No Edema] : there was no peripheral edema [No Palpable Aorta] : no palpable aorta [No Extremity Clubbing/Cyanosis] : no extremity clubbing/cyanosis [Soft] : abdomen soft [Non Tender] : non-tender [Non-distended] : non-distended [No Masses] : no abdominal mass palpated [No HSM] : no HSM [Normal Bowel Sounds] : normal bowel sounds [Normal Posterior Cervical Nodes] : no posterior cervical lymphadenopathy [Normal Anterior Cervical Nodes] : no anterior cervical lymphadenopathy [No CVA Tenderness] : no CVA  tenderness [No Spinal Tenderness] : no spinal tenderness [No Joint Swelling] : no joint swelling [Grossly Normal Strength/Tone] : grossly normal strength/tone [No Rash] : no rash [Coordination Grossly Intact] : coordination grossly intact [No Focal Deficits] : no focal deficits [Normal Gait] : normal gait [Deep Tendon Reflexes (DTR)] : deep tendon reflexes were 2+ and symmetric [Normal Affect] : the affect was normal [Normal Insight/Judgement] : insight and judgment were intact [de-identified] : Deafness

## 2022-12-06 NOTE — HEALTH RISK ASSESSMENT
[Fair] :  ~his/her~ mood as fair [Former] : Former [No] : No [No falls in past year] : Patient reported no falls in the past year [0] : 2) Feeling down, depressed, or hopeless: Not at all (0) [PHQ-2 Negative - No further assessment needed] : PHQ-2 Negative - No further assessment needed [Behavior] : difficulty with behavior [With Significant Other] : lives with significant other [Retired] : retired [] :  [# Of Children ___] : has [unfilled] children [Sexually Active] : sexually active [Fully functional (bathing, dressing, toileting, transferring, walking, feeding)] : Fully functional (bathing, dressing, toileting, transferring, walking, feeding) [Fully functional (using the telephone, shopping, preparing meals, housekeeping, doing laundry, using] : Fully functional and needs no help or supervision to perform IADLs (using the telephone, shopping, preparing meals, housekeeping, doing laundry, using transportation, managing medications and managing finances) [Reports changes in hearing] : Reports changes in hearing [Reports normal functional visual acuity (ie: able to read med bottle)] : Reports normal functional visual acuity [Reviewed no changes] : Reviewed, no changes [de-identified] : Minimal [de-identified] : Standard [Change in mental status noted] : No change in mental status noted [Language] : denies difficulty with language [Learning/Retaining New Information] : denies difficulty learning/retaining new information [Handling Complex Tasks] : denies difficulty handling complex tasks [Reasoning] : denies difficulty with reasoning [Spatial Ability and Orientation] : denies difficulty with spatial ability and orientation [Reports changes in vision] : Reports no changes in vision [de-identified] : Deafness

## 2023-01-09 ENCOUNTER — RX RENEWAL (OUTPATIENT)
Age: 83
End: 2023-01-09

## 2023-03-06 ENCOUNTER — RX RENEWAL (OUTPATIENT)
Age: 83
End: 2023-03-06

## 2023-04-07 RX ORDER — AMOXICILLIN 500 MG/1
500 CAPSULE ORAL TWICE DAILY
Qty: 20 | Refills: 0 | Status: ACTIVE | COMMUNITY
Start: 2022-07-22 | End: 1900-01-01

## 2023-05-24 ENCOUNTER — RX RENEWAL (OUTPATIENT)
Age: 83
End: 2023-05-24

## 2023-08-31 ENCOUNTER — RX RENEWAL (OUTPATIENT)
Age: 83
End: 2023-08-31

## 2023-09-20 ENCOUNTER — NON-APPOINTMENT (OUTPATIENT)
Age: 83
End: 2023-09-20

## 2023-09-20 ENCOUNTER — APPOINTMENT (OUTPATIENT)
Dept: INTERNAL MEDICINE | Facility: CLINIC | Age: 83
End: 2023-09-20
Payer: MEDICARE

## 2023-09-20 VITALS
BODY MASS INDEX: 23.14 KG/M2 | TEMPERATURE: 96.6 F | RESPIRATION RATE: 18 BRPM | WEIGHT: 144 LBS | HEIGHT: 66 IN | HEART RATE: 54 BPM | OXYGEN SATURATION: 97 % | DIASTOLIC BLOOD PRESSURE: 74 MMHG | SYSTOLIC BLOOD PRESSURE: 134 MMHG

## 2023-09-20 DIAGNOSIS — M17.0 BILATERAL PRIMARY OSTEOARTHRITIS OF KNEE: ICD-10-CM

## 2023-09-20 DIAGNOSIS — Z13.31 ENCOUNTER FOR SCREENING FOR DEPRESSION: ICD-10-CM

## 2023-09-20 DIAGNOSIS — E55.9 VITAMIN D DEFICIENCY, UNSPECIFIED: ICD-10-CM

## 2023-09-20 DIAGNOSIS — Z13.39 ENCOUNTER FOR SCREENING EXAM FOR OTHER MENTAL HEALTH AND BEHAVIORAL DISORDERS: ICD-10-CM

## 2023-09-20 DIAGNOSIS — F03.90 UNSPECIFIED DEMENTIA W/OUT BEHAVIORAL DISTURBANCE: ICD-10-CM

## 2023-09-20 DIAGNOSIS — Z23 ENCOUNTER FOR IMMUNIZATION: ICD-10-CM

## 2023-09-20 PROCEDURE — 93000 ELECTROCARDIOGRAM COMPLETE: CPT | Mod: 59

## 2023-09-20 PROCEDURE — 99215 OFFICE O/P EST HI 40 MIN: CPT | Mod: 25

## 2023-09-20 PROCEDURE — 99497 ADVNCD CARE PLAN 30 MIN: CPT | Mod: 25

## 2023-09-20 PROCEDURE — G0008: CPT

## 2023-09-20 PROCEDURE — 90662 IIV NO PRSV INCREASED AG IM: CPT

## 2023-09-20 PROCEDURE — 36415 COLL VENOUS BLD VENIPUNCTURE: CPT

## 2023-09-20 PROCEDURE — G0439: CPT

## 2023-09-20 PROCEDURE — G0444 DEPRESSION SCREEN ANNUAL: CPT | Mod: 59

## 2023-09-21 LAB
25(OH)D3 SERPL-MCNC: 50 NG/ML
ALBUMIN SERPL ELPH-MCNC: 4.4 G/DL
ALP BLD-CCNC: 53 U/L
ALT SERPL-CCNC: 9 U/L
ANION GAP SERPL CALC-SCNC: 11 MMOL/L
APPEARANCE: CLEAR
AST SERPL-CCNC: 19 U/L
BASOPHILS # BLD AUTO: 0.13 K/UL
BASOPHILS NFR BLD AUTO: 1.5 %
BILIRUB SERPL-MCNC: 0.5 MG/DL
BILIRUBIN URINE: NEGATIVE
BLOOD URINE: NEGATIVE
BUN SERPL-MCNC: 16 MG/DL
CALCIUM SERPL-MCNC: 9.4 MG/DL
CHLORIDE SERPL-SCNC: 104 MMOL/L
CHOLEST SERPL-MCNC: 200 MG/DL
CO2 SERPL-SCNC: 26 MMOL/L
COLOR: YELLOW
CREAT SERPL-MCNC: 1.07 MG/DL
EGFR: 69 ML/MIN/1.73M2
EOSINOPHIL # BLD AUTO: 0.74 K/UL
EOSINOPHIL NFR BLD AUTO: 8.5 %
ESTIMATED AVERAGE GLUCOSE: 105 MG/DL
GGT SERPL-CCNC: 31 U/L
GLUCOSE QUALITATIVE U: NEGATIVE MG/DL
GLUCOSE SERPL-MCNC: 77 MG/DL
HBA1C MFR BLD HPLC: 5.3 %
HCT VFR BLD CALC: 47.5 %
HDLC SERPL-MCNC: 62 MG/DL
HGB BLD-MCNC: 14.9 G/DL
IMM GRANULOCYTES NFR BLD AUTO: 0.2 %
KETONES URINE: NEGATIVE MG/DL
LDLC SERPL CALC-MCNC: 120 MG/DL
LEUKOCYTE ESTERASE URINE: NEGATIVE
LYMPHOCYTES # BLD AUTO: 2.34 K/UL
LYMPHOCYTES NFR BLD AUTO: 26.9 %
MAN DIFF?: NORMAL
MCHC RBC-ENTMCNC: 29.7 PG
MCHC RBC-ENTMCNC: 31.4 GM/DL
MCV RBC AUTO: 94.6 FL
MONOCYTES # BLD AUTO: 0.63 K/UL
MONOCYTES NFR BLD AUTO: 7.2 %
NEUTROPHILS # BLD AUTO: 4.83 K/UL
NEUTROPHILS NFR BLD AUTO: 55.7 %
NITRITE URINE: NEGATIVE
NONHDLC SERPL-MCNC: 138 MG/DL
PH URINE: 6.5
PLATELET # BLD AUTO: 269 K/UL
POTASSIUM SERPL-SCNC: 4.3 MMOL/L
PROT SERPL-MCNC: 6.8 G/DL
PROTEIN URINE: NEGATIVE MG/DL
PSA SERPL-MCNC: 3.82 NG/ML
RBC # BLD: 5.02 M/UL
RBC # FLD: 13.1 %
SODIUM SERPL-SCNC: 141 MMOL/L
SPECIFIC GRAVITY URINE: 1.02
TRIGL SERPL-MCNC: 99 MG/DL
TSH SERPL-ACNC: 1.05 UIU/ML
UROBILINOGEN URINE: 1 MG/DL
WBC # FLD AUTO: 8.69 K/UL

## 2023-09-22 PROBLEM — M17.0 ARTHRITIS OF BOTH KNEES: Status: ACTIVE | Noted: 2021-06-29

## 2023-09-22 PROBLEM — F03.90 DEMENTIA: Status: ACTIVE | Noted: 2021-06-29

## 2023-09-22 PROBLEM — Z13.39 ALCOHOL SCREENING: Status: ACTIVE | Noted: 2022-07-22

## 2023-09-22 PROBLEM — E55.9 VITAMIN D DEFICIENCY: Status: ACTIVE | Noted: 2021-06-29

## 2023-11-20 ENCOUNTER — RX RENEWAL (OUTPATIENT)
Age: 83
End: 2023-11-20

## 2024-01-17 ENCOUNTER — APPOINTMENT (OUTPATIENT)
Dept: INTERNAL MEDICINE | Facility: CLINIC | Age: 84
End: 2024-01-17
Payer: MEDICARE

## 2024-01-17 VITALS
DIASTOLIC BLOOD PRESSURE: 80 MMHG | RESPIRATION RATE: 18 BRPM | BODY MASS INDEX: 23.63 KG/M2 | WEIGHT: 147 LBS | OXYGEN SATURATION: 96 % | SYSTOLIC BLOOD PRESSURE: 124 MMHG | HEART RATE: 66 BPM | HEIGHT: 66 IN | TEMPERATURE: 98 F

## 2024-01-17 DIAGNOSIS — J06.9 ACUTE UPPER RESPIRATORY INFECTION, UNSPECIFIED: ICD-10-CM

## 2024-01-17 LAB — S PYO AG SPEC QL IA: NEGATIVE

## 2024-01-17 PROCEDURE — 87880 STREP A ASSAY W/OPTIC: CPT | Mod: QW

## 2024-01-17 PROCEDURE — 87804 INFLUENZA ASSAY W/OPTIC: CPT | Mod: QW

## 2024-01-17 PROCEDURE — 99213 OFFICE O/P EST LOW 20 MIN: CPT | Mod: 25

## 2024-01-17 RX ORDER — AZITHROMYCIN 250 MG/1
250 TABLET, FILM COATED ORAL
Qty: 1 | Refills: 0 | Status: ACTIVE | COMMUNITY
Start: 2024-01-17 | End: 1900-01-01

## 2024-01-18 ENCOUNTER — RX RENEWAL (OUTPATIENT)
Age: 84
End: 2024-01-18

## 2024-01-18 LAB
RAPID RVP RESULT: DETECTED
SARS-COV-2 RNA PNL RESP NAA+PROBE: DETECTED

## 2024-01-18 NOTE — HISTORY OF PRESENT ILLNESS
[FreeTextEntry1] : URI symptoms, sore throat....... [de-identified] : 82 y/o M presents for f/u, [facility] annual wellness Hx of lipids, htn, hypothyroid, BPH, dementia and deafness.  cc: sore throat, congestion, fatigue onset x 2 weeks Denies dyspnea, chest pain, vomiting, diarrhea   Of note, he received both doses of the covid vaccine. Voices no further complaints. ROS as documented below.

## 2024-01-18 NOTE — PHYSICAL EXAM
[No Acute Distress] : no acute distress [Well Nourished] : well nourished [Well Developed] : well developed [Well-Appearing] : well-appearing [Normal Sclera/Conjunctiva] : normal sclera/conjunctiva [PERRL] : pupils equal round and reactive to light [EOMI] : extraocular movements intact [Normal Outer Ear/Nose] : the outer ears and nose were normal in appearance [No JVD] : no jugular venous distention [No Lymphadenopathy] : no lymphadenopathy [Supple] : supple [Thyroid Normal, No Nodules] : the thyroid was normal and there were no nodules present [No Respiratory Distress] : no respiratory distress  [No Accessory Muscle Use] : no accessory muscle use [Clear to Auscultation] : lungs were clear to auscultation bilaterally [Normal Rate] : normal rate  [Regular Rhythm] : with a regular rhythm [Normal S1, S2] : normal S1 and S2 [No Murmur] : no murmur heard [No Carotid Bruits] : no carotid bruits [No Abdominal Bruit] : a ~M bruit was not heard ~T in the abdomen [No Varicosities] : no varicosities [Pedal Pulses Present] : the pedal pulses are present [No Edema] : there was no peripheral edema [No Palpable Aorta] : no palpable aorta [No Extremity Clubbing/Cyanosis] : no extremity clubbing/cyanosis [Soft] : abdomen soft [Non Tender] : non-tender [Non-distended] : non-distended [No Masses] : no abdominal mass palpated [No HSM] : no HSM [Normal Bowel Sounds] : normal bowel sounds [Normal Posterior Cervical Nodes] : no posterior cervical lymphadenopathy [Normal Anterior Cervical Nodes] : no anterior cervical lymphadenopathy [No CVA Tenderness] : no CVA  tenderness [No Spinal Tenderness] : no spinal tenderness [No Joint Swelling] : no joint swelling [Grossly Normal Strength/Tone] : grossly normal strength/tone [No Rash] : no rash [Coordination Grossly Intact] : coordination grossly intact [No Focal Deficits] : no focal deficits [Normal Gait] : normal gait [Deep Tendon Reflexes (DTR)] : deep tendon reflexes were 2+ and symmetric [Normal Affect] : the affect was normal [Normal Insight/Judgement] : insight and judgment were intact [Normal] : affect was normal and insight and judgment were intact [de-identified] : mild erythema of the posterior pharynx with clear PNDrip, no tonsillar edema

## 2024-01-18 NOTE — ADDENDUM
[FreeTextEntry1] : On Thursday at about 530 patient was informed that he has COVID.  His wife was also informed.  He denied shortness of breath or change in mental status he was told to.  Quarantine for 5 days he declined Paxlovid.      Symptomatic patients : Test for influenza, if positive, treat for influenza and do not continue below.  1. Fever plus cough or shortness of breath : Test for RVP and COVID-19. 2.Indirect, circumstantial or unclear exposure to COVID-19, or other concerning cases not meeting above criteria: Please call [facility] to discuss testing.  +++ All above cases must be reported to the [facility] registry. +++  Asymptomatic patients:  1. Known first-degree direct-contact exposure to positive COVID-19 patient but asymptomatic: No testing PLUS 14 day self-quarantine. Pt to call if symptoms develop. Report to [facility] Registry. 2. No known exposure and asymptomatic, referred from outside healthcare organization: Please call [facility] to discuss testing.  3.All other asymptomatic patients with no known exposures: no testing, no exceptions.

## 2024-01-18 NOTE — HEALTH RISK ASSESSMENT
[No] : No [No falls in past year] : Patient reported no falls in the past year [0] : 2) Feeling down, depressed, or hopeless: Not at all (0) [Reviewed no changes] : Reviewed, no changes [I will adhere to the patient's wishes.] : I will adhere to the patient's wishes. [Never] : Never [de-identified] : Minimal [de-identified] : Standard

## 2024-01-18 NOTE — COUNSELING
[Fall prevention counseling provided] : Fall prevention counseling provided [Adequate lighting] : Adequate lighting [No throw rugs] : No throw rugs [Use proper foot wear] : Use proper foot wear [Use recommended devices] : Use recommended devices [Behavioral health counseling provided] : Behavioral health counseling provided [Sleep ___ hours/day] : Sleep [unfilled] hours/day [Engage in a relaxing activity] : Engage in a relaxing activity [Plan in advance] : Plan in advance [None] : None [Good understanding] : Patient has a good understanding of lifestyle changes and steps needed to achieve self management goal [de-identified] : Total face-to-face time with patient - 15 minutes; >50% involved counselling, review of labs/tests, and/or coordination of medical care:

## 2024-01-31 ENCOUNTER — RX RENEWAL (OUTPATIENT)
Age: 84
End: 2024-01-31

## 2024-03-09 RX ORDER — DICLOFENAC SODIUM 100 MG/1
100 TABLET, FILM COATED, EXTENDED RELEASE ORAL
Qty: 30 | Refills: 3 | Status: ACTIVE | COMMUNITY
Start: 2021-07-03 | End: 1900-01-01

## 2024-03-13 ENCOUNTER — APPOINTMENT (OUTPATIENT)
Dept: INTERNAL MEDICINE | Facility: CLINIC | Age: 84
End: 2024-03-13
Payer: MEDICARE

## 2024-03-13 VITALS
HEART RATE: 67 BPM | DIASTOLIC BLOOD PRESSURE: 74 MMHG | OXYGEN SATURATION: 98 % | BODY MASS INDEX: 24.27 KG/M2 | HEIGHT: 66 IN | TEMPERATURE: 97.8 F | SYSTOLIC BLOOD PRESSURE: 130 MMHG | WEIGHT: 151 LBS | RESPIRATION RATE: 18 BRPM

## 2024-03-13 DIAGNOSIS — Z02.9 ENCOUNTER FOR ADMINISTRATIVE EXAMINATIONS, UNSPECIFIED: ICD-10-CM

## 2024-03-13 DIAGNOSIS — R13.10 DYSPHAGIA, UNSPECIFIED: ICD-10-CM

## 2024-03-13 DIAGNOSIS — E03.9 HYPOTHYROIDISM, UNSPECIFIED: ICD-10-CM

## 2024-03-13 DIAGNOSIS — D33.2 BENIGN NEOPLASM OF BRAIN, UNSPECIFIED: ICD-10-CM

## 2024-03-13 DIAGNOSIS — H91.90 UNSPECIFIED HEARING LOSS, UNSPECIFIED EAR: ICD-10-CM

## 2024-03-13 DIAGNOSIS — D33.0 BENIGN NEOPLASM OF BRAIN, SUPRATENTORIAL: ICD-10-CM

## 2024-03-13 DIAGNOSIS — F31.9 BIPOLAR DISORDER, UNSPECIFIED: ICD-10-CM

## 2024-03-13 DIAGNOSIS — E78.5 HYPERLIPIDEMIA, UNSPECIFIED: ICD-10-CM

## 2024-03-13 DIAGNOSIS — K21.9 GASTRO-ESOPHAGEAL REFLUX DISEASE W/OUT ESOPHAGITIS: ICD-10-CM

## 2024-03-13 PROCEDURE — 99401 PREV MED CNSL INDIV APPRX 15: CPT | Mod: GY

## 2024-03-13 PROCEDURE — 99215 OFFICE O/P EST HI 40 MIN: CPT

## 2024-03-13 PROCEDURE — G2211 COMPLEX E/M VISIT ADD ON: CPT

## 2024-03-13 NOTE — PHYSICAL EXAM
[No Acute Distress] : no acute distress [Well Developed] : well developed [Well Nourished] : well nourished [Normal Sclera/Conjunctiva] : normal sclera/conjunctiva [Well-Appearing] : well-appearing [PERRL] : pupils equal round and reactive to light [EOMI] : extraocular movements intact [Normal Outer Ear/Nose] : the outer ears and nose were normal in appearance [No JVD] : no jugular venous distention [No Lymphadenopathy] : no lymphadenopathy [Thyroid Normal, No Nodules] : the thyroid was normal and there were no nodules present [Supple] : supple [No Respiratory Distress] : no respiratory distress  [No Accessory Muscle Use] : no accessory muscle use [Clear to Auscultation] : lungs were clear to auscultation bilaterally [Normal Rate] : normal rate  [Regular Rhythm] : with a regular rhythm [No Murmur] : no murmur heard [Normal S1, S2] : normal S1 and S2 [No Carotid Bruits] : no carotid bruits [No Abdominal Bruit] : a ~M bruit was not heard ~T in the abdomen [No Varicosities] : no varicosities [No Edema] : there was no peripheral edema [Pedal Pulses Present] : the pedal pulses are present [No Palpable Aorta] : no palpable aorta [No Extremity Clubbing/Cyanosis] : no extremity clubbing/cyanosis [Soft] : abdomen soft [Non Tender] : non-tender [Non-distended] : non-distended [No Masses] : no abdominal mass palpated [No HSM] : no HSM [Normal Bowel Sounds] : normal bowel sounds [Normal Posterior Cervical Nodes] : no posterior cervical lymphadenopathy [Normal Anterior Cervical Nodes] : no anterior cervical lymphadenopathy [No CVA Tenderness] : no CVA  tenderness [No Joint Swelling] : no joint swelling [No Spinal Tenderness] : no spinal tenderness [Grossly Normal Strength/Tone] : grossly normal strength/tone [No Rash] : no rash [Coordination Grossly Intact] : coordination grossly intact [Normal Gait] : normal gait [No Focal Deficits] : no focal deficits [Normal Affect] : the affect was normal [Deep Tendon Reflexes (DTR)] : deep tendon reflexes were 2+ and symmetric [Normal Insight/Judgement] : insight and judgment were intact [Normal Appearance] : normal in appearance [Declined Rectal Exam] : declined rectal exam [Normal] : normal gait, coordination grossly intact, no focal deficits and deep tendon reflexes were 2+ and symmetric [de-identified] : mild erythema of the posterior pharynx with clear PNDrip, no tonsillar edema  [de-identified] : Thank [de-identified] : Mild tenderness epigastric [de-identified] : No flank pain

## 2024-03-13 NOTE — ADDENDUM
[FreeTextEntry1] : On Thursday at about 530 patient was informed that he has COVID.  His wife was also informed.  He denied shortness of breath or change in mental status he was told to.  Quarantine for 5 days he declined Paxlovid.      Symptomatic patients : Test for influenza, if positive, treat for influenza and do not continue below.  1. Fever plus cough or shortness of breath : Test for RVP and COVID-19. 2.Indirect, circumstantial or unclear exposure to COVID-19, or other concerning cases not meeting above criteria: Please call [facility] to discuss testing.  +++ All above cases must be reported to the [facility] registry. +++  Asymptomatic patients:  1. Known first-degree direct-contact exposure to positive COVID-19 patient but asymptomatic: No testing PLUS 14 day self-quarantine. Pt to call if symptoms develop. Report to [facility] Registry. 2. No known exposure and asymptomatic, referred from outside healthcare organization: Please call [facility] to discuss testing.  3.All other asymptomatic patients with no known exposures: no testing, no exceptions.

## 2024-03-13 NOTE — HISTORY OF PRESENT ILLNESS
[FreeTextEntry1] : follow up /general health maintenance for this 83-year-old male.  Patient is extremely stressed out he is going through a divorce.  He has a history of bipolar affective disorder benign brain tumor dementia deafness GERD hypothyroidism hyperlipidemia Chief complaint today -problem with eating, it is that food gets stuck in his esophagus.  [de-identified] : 84 y/o M presents for f/u, M  cc: can only eat a little bit and can't eat anymore. Almost 2 years ... Patient states that he has an appetite but food gets stuck in his chest area of the esophagus.  He denies fever chills cough chest pain or shortness of Hx of lipids, htn, hypothyroid, BPH, dementia and deafness.   Of note, he received both doses of the covid vaccine. Voices no further complaints. ROS as documented below. Denies fever, cough, chills, body aches and SOB  I Jenna Rosa, am scribing for and in the presence of Dr. Hicks, the following sections of:  HISTORY OF PRESENT ILLNESS, PAST MEDICAL/FAMILY/SOCIAL HISTORY, ROS, VITALS, PE, DISPOSITION

## 2024-03-13 NOTE — HEALTH RISK ASSESSMENT
[No falls in past year] : Patient reported no falls in the past year [No] : No [Reviewed no changes] : Reviewed, no changes [Never] : Never [I will adhere to the patient's wishes.] : I will adhere to the patient's wishes. [1] : 2) Feeling down, depressed, or hopeless for several days (1) [PHQ-2 Positive] : PHQ-2 Positive [Relationship: ___] : Relationship: [unfilled] [de-identified] : Gastro as needed [de-identified] : Minimal [de-identified] : Standard

## 2024-03-13 NOTE — COUNSELING
[Fall prevention counseling provided] : Fall prevention counseling provided [Adequate lighting] : Adequate lighting [No throw rugs] : No throw rugs [Use recommended devices] : Use recommended devices [Use proper foot wear] : Use proper foot wear [Sleep ___ hours/day] : Sleep [unfilled] hours/day [Behavioral health counseling provided] : Behavioral health counseling provided [Engage in a relaxing activity] : Engage in a relaxing activity [Plan in advance] : Plan in advance [None] : None [Good understanding] : Patient has a good understanding of lifestyle changes and steps needed to achieve self management goal [de-identified] : Total face-to-face time with patient - 15 minutes; >50% involved counselling, review of labs/tests, and/or coordination of medical care: Avoid tomato products, mint, citrus (drinks / fruit), fried fatty foods, caffeine, chocolate.Avoid food / drink ,2 hours prior to bedtime / napping: Symptomatic patients : Test for influenza, if positive, treat for influenza and do not continue below.  1. Fever plus cough or shortness of breath : Test for RVP and COVID-19. 2.Indirect, circumstantial or unclear exposure to COVID-19, or other concerning cases not meeting above criteria: Please call [facility] to discuss testing.  +++ All above cases must be reported to the [facility] registry. +++  Asymptomatic patients:  1. Known first-degree direct-contact exposure to positive COVID-19 patient but asymptomatic: No testing PLUS 14 day self-quarantine. Pt to call if symptoms develop. Report to [facility] Registry. 2. No known exposure and asymptomatic, referred from outside healthcare organization: Please call [facility] to discuss testing.  3.All other asymptomatic patients with no known exposures: no testing, no exceptions.  1. Make an appointment with your doctor Begin your fall-prevention plan by making an appointment with your doctor. Be prepared to answer questions such as: low chol diet. Avoid fried foods, red meat, butter, eggs, hard cheeses. Use canola or olive oil preferred.   - Encouraged a low fat/low cholesterol diet  - Discussed Healthy eating, avoidance of concentrated sweets, and to include vegetables by at least 3 meals a day  - encouraged low glycemic fruits, grains and vegetables and a diet high in plant protein  - Discussed regular exercise  - Discussed importance of follow up physician visits What medications are you taking? Make a list of your prescription and over-the-counter medications and supplements, or bring them with you to the appointment. Your doctor can review your medications for side effects and interactions that may increase your risk of falling. To help with fall prevention, your doctor may consider weaning you off medications that make you tired or affect your thinking, such as sedatives and some types of antidepressants. Have you fallen before? Write down the details, including when, where and how you fell. Be prepared to discuss instances when you almost fell but were caught by someone or managed to grab hold of something just in time. Details such as these may help your doctor identify specific fall-prevention strategies. Could your health conditions cause a fall? Certain eye and ear disorders may increase your risk of falls. Be prepared to discuss your health conditions and how comfortable you are when you walk - for example, do you feel any dizziness, joint pain, shortness of breath, or numbness in your feet and legs when you walk? Your doctor may evaluate your muscle strength, balance and walking style (gait) as well. 2. Keep moving Physical activity can go a long way toward fall prevention. With your doctor's OK, consider activities such as walking, water workouts or stephie chi - a gentle exercise that involves slow and graceful dance-like movements. Such activities reduce the risk of falls by improving strength, balance, coordination and flexibility.  If you avoid physical activity because you're afraid it will make a fall more likely, tell your doctor. He or she may recommend carefully monitored exercise programs or refer you to a physical therapist. The physical therapist can create a custom exercise program aimed at improving your balance, flexibility, muscle strength and gait.  3. Wear sensible shoes Consider changing your footwear as part of your fall-prevention plan. High heels, floppy slippers and shoes with slick soles can make you slip, stumble and fall. So can walking in your stocking feet. Instead, wear properly fitting, sturdy shoes with nonskid soles. Sensible shoes may also reduce joint pain.  4. Remove home hazards Take a look around your home. Your living room, kitchen, bedroom, bathroom, hallways and stairways may be filled with hazards. To make your home safer:  Remove boxes, newspapers, electrical cords and phone cords from walkways. Move coffee tables, magazine racks and plant stands from high-traffic areas. Secure loose rugs with double-faced tape, tacks or a slip-resistant backing - or remove loose rugs from your home. Repair loose, wooden floorboards and carpeting right away. Store clothing, dishes, food and other necessities within easy reach. Immediately clean spilled liquids, grease or food. Use nonslip mats in your bathtub or shower. Use a bath seat, which allows you to sit while showering. 5. Light up your living space Keep your home brightly lit to avoid tripping on objects that are hard to see. Also:  Place night lights in your bedroom, bathroom and hallways. Place a lamp within reach of your bed for middle-of-the-night needs. Make clear paths to light switches that aren't near room entrances. Consider trading traditional switches for glow-in-the-dark or illuminated switches. Turn on the lights before going up or down stairs. Store flashlights in easy-to-find places in case of power outages. 6. Use assistive devices Your doctor might recommend using a cane or walker to keep you steady. Other assistive devices can help, too. For example:  Hand rails for both sides of stairways Nonslip treads for bare-wood steps A raised toilet seat or one with armrests Grab bars for the shower or tub A sturdy plastic seat for the shower or tub - plus a hand-held shower nozzle for bathing while sitting down If necessary, ask your doctor for a referral to an occupational therapist. He or she can help you brainstorm other fall-prevention strategies. Some solutions are easily installed and relatively inexpensive. Others may require professional help or a larger investment. If you're concerned about the cost, remember that an investment in fall prevention is an investment in your independence.

## 2024-03-13 NOTE — REVIEW OF SYSTEMS
[Negative] : Heme/Lymph [Hearing Loss] : hearing loss [Anxiety] : anxiety [FreeTextEntry7] : Dysphagia

## 2024-03-27 ENCOUNTER — APPOINTMENT (OUTPATIENT)
Dept: INTERNAL MEDICINE | Facility: CLINIC | Age: 84
End: 2024-03-27

## 2024-03-28 RX ORDER — ACETAMINOPHEN AND CODEINE PHOSPHATE 300; 30 MG/1; MG/1
300-30 TABLET ORAL
Qty: 90 | Refills: 0 | Status: ACTIVE | COMMUNITY
Start: 2021-07-23 | End: 1900-01-01

## 2024-04-04 ENCOUNTER — RX CHANGE (OUTPATIENT)
Age: 84
End: 2024-04-04

## 2024-04-04 RX ORDER — OMEPRAZOLE 40 MG/1
40 CAPSULE, DELAYED RELEASE ORAL
Qty: 90 | Refills: 1 | Status: ACTIVE | COMMUNITY
Start: 1900-01-01 | End: 1900-01-01

## 2024-04-04 RX ORDER — OMEPRAZOLE 40 MG/1
40 CAPSULE, DELAYED RELEASE ORAL
Qty: 30 | Refills: 1 | Status: DISCONTINUED | COMMUNITY
Start: 2024-03-13 | End: 2024-04-04

## 2024-05-05 ENCOUNTER — RX RENEWAL (OUTPATIENT)
Age: 84
End: 2024-05-05

## 2024-06-14 ENCOUNTER — RX RENEWAL (OUTPATIENT)
Age: 84
End: 2024-06-14

## 2024-06-14 RX ORDER — LEVOTHYROXINE SODIUM 0.09 MG/1
88 TABLET ORAL DAILY
Qty: 90 | Refills: 0 | Status: ACTIVE | COMMUNITY
Start: 2021-09-10 | End: 1900-01-01

## 2024-06-14 RX ORDER — AMOXICILLIN 500 MG/1
500 CAPSULE ORAL
Qty: 30 | Refills: 0 | Status: ACTIVE | COMMUNITY
Start: 2021-12-15 | End: 1900-01-01

## 2024-07-31 ENCOUNTER — APPOINTMENT (OUTPATIENT)
Dept: INTERNAL MEDICINE | Facility: CLINIC | Age: 84
End: 2024-07-31

## 2024-07-31 ENCOUNTER — LABORATORY RESULT (OUTPATIENT)
Age: 84
End: 2024-07-31

## 2024-07-31 VITALS
RESPIRATION RATE: 17 BRPM | DIASTOLIC BLOOD PRESSURE: 80 MMHG | BODY MASS INDEX: 25.23 KG/M2 | HEIGHT: 66 IN | TEMPERATURE: 96.7 F | SYSTOLIC BLOOD PRESSURE: 138 MMHG | OXYGEN SATURATION: 97 % | HEART RATE: 69 BPM | WEIGHT: 157 LBS

## 2024-07-31 DIAGNOSIS — K21.9 GASTRO-ESOPHAGEAL REFLUX DISEASE W/OUT ESOPHAGITIS: ICD-10-CM

## 2024-07-31 DIAGNOSIS — N40.1 BENIGN PROSTATIC HYPERPLASIA WITH LOWER URINARY TRACT SYMPMS: ICD-10-CM

## 2024-07-31 DIAGNOSIS — E03.9 HYPOTHYROIDISM, UNSPECIFIED: ICD-10-CM

## 2024-07-31 DIAGNOSIS — N41.9 INFLAMMATORY DISEASE OF PROSTATE, UNSPECIFIED: ICD-10-CM

## 2024-07-31 DIAGNOSIS — R32 UNSPECIFIED URINARY INCONTINENCE: ICD-10-CM

## 2024-07-31 DIAGNOSIS — H91.90 UNSPECIFIED HEARING LOSS, UNSPECIFIED EAR: ICD-10-CM

## 2024-07-31 DIAGNOSIS — Z00.00 ENCOUNTER FOR GENERAL ADULT MEDICAL EXAMINATION W/OUT ABNORMAL FINDINGS: ICD-10-CM

## 2024-07-31 DIAGNOSIS — E78.5 HYPERLIPIDEMIA, UNSPECIFIED: ICD-10-CM

## 2024-07-31 LAB
BILIRUB UR QL STRIP: NEGATIVE
GLUCOSE UR-MCNC: NEGATIVE
HCG UR QL: 0.2 EU/DL
HGB UR QL STRIP.AUTO: NEGATIVE
KETONES UR-MCNC: NEGATIVE
LEUKOCYTE ESTERASE UR QL STRIP: NEGATIVE
NITRITE UR QL STRIP: NEGATIVE
PH UR STRIP: 6
PROT UR STRIP-MCNC: NEGATIVE
SP GR UR STRIP: 1.03

## 2024-07-31 PROCEDURE — 81003 URINALYSIS AUTO W/O SCOPE: CPT | Mod: QW

## 2024-07-31 PROCEDURE — 36415 COLL VENOUS BLD VENIPUNCTURE: CPT

## 2024-07-31 PROCEDURE — 99214 OFFICE O/P EST MOD 30 MIN: CPT

## 2024-07-31 PROCEDURE — 99401 PREV MED CNSL INDIV APPRX 15: CPT | Mod: GY,25

## 2024-07-31 PROCEDURE — G2211 COMPLEX E/M VISIT ADD ON: CPT

## 2024-07-31 RX ORDER — TAMSULOSIN HYDROCHLORIDE 0.4 MG/1
0.4 CAPSULE ORAL
Qty: 90 | Refills: 1 | Status: ACTIVE | COMMUNITY
Start: 2024-07-31 | End: 1900-01-01

## 2024-07-31 RX ORDER — CIPROFLOXACIN HYDROCHLORIDE 500 MG/1
500 TABLET, FILM COATED ORAL
Qty: 20 | Refills: 0 | Status: ACTIVE | COMMUNITY
Start: 2024-07-31 | End: 1900-01-01

## 2024-07-31 NOTE — HISTORY OF PRESENT ILLNESS
[FreeTextEntry1] : F/U and a GHM for this 83-year-old male. Hx of lipids, htn, hypothyroid, BPH, dementia and deafness.  CC: Patient is here today for a routine follow up and because he has been having urine incontinence, he states that he could be doing anything throughout the day when he realizes that his pants are wet from urine. Would like to get checked to make sure that he is okay before he goes away to [place] this week.  [de-identified] : 83 year old male presents himself today for a F/U and a GHM.  Hx of lipids, htn, hypothyroid, BPH, dementia and deafness.   Patient is here today for a routine follow up and because he has been having urine incontinence, he states that he could be doing anything throughout the day when he realizes that his pants are wet from urine. Would like to get checked to make sure that he is okay before he goes away to [place] this week.  Otherwise patient is stable.   Voices no further complaints. ROS as documented below. Denies fever, cough, chills, body aches and SOB  I Mary Ziegler, am scribing for and in the presence of Dr. Hicks, the following sections of:  HISTORY OF PRESENT ILLNESS, PAST MEDICAL/FAMILY/SOCIAL HISTORY, ROS, VITALS, PE, DISPOSITION

## 2024-07-31 NOTE — HEALTH RISK ASSESSMENT
[de-identified] : Referred to urology [de-identified] : Drinks wine with me [de-identified] : Minimal [de-identified] : Standard [place] [Change in mental status noted] : No change in mental status noted [Language] : denies difficulty with language [Behavior] : denies difficulty with behavior [Learning/Retaining New Information] : denies difficulty learning/retaining new information [Handling Complex Tasks] : denies difficulty handling complex tasks [Reasoning] : denies difficulty with reasoning [Spatial Ability and Orientation] : denies difficulty with spatial ability and orientation

## 2024-08-01 LAB
25(OH)D3 SERPL-MCNC: 52.4 NG/ML
ALBUMIN SERPL ELPH-MCNC: 4.4 G/DL
ALP BLD-CCNC: 59 U/L
ALT SERPL-CCNC: 12 U/L
ANION GAP SERPL CALC-SCNC: 14 MMOL/L
APPEARANCE: CLEAR
AST SERPL-CCNC: 20 U/L
BASOPHILS # BLD AUTO: 0.09 K/UL
BASOPHILS NFR BLD AUTO: 1.1 %
BILIRUB SERPL-MCNC: 0.5 MG/DL
BILIRUBIN URINE: NEGATIVE
BLOOD URINE: NEGATIVE
BUN SERPL-MCNC: 15 MG/DL
CALCIUM SERPL-MCNC: 9.4 MG/DL
CHLORIDE SERPL-SCNC: 104 MMOL/L
CHOLEST SERPL-MCNC: 241 MG/DL
CO2 SERPL-SCNC: 23 MMOL/L
COLOR: NORMAL
CREAT SERPL-MCNC: 1.1 MG/DL
EGFR: 66 ML/MIN/1.73M2
EOSINOPHIL # BLD AUTO: 0.66 K/UL
EOSINOPHIL NFR BLD AUTO: 8 %
ESTIMATED AVERAGE GLUCOSE: 100 MG/DL
GGT SERPL-CCNC: 27 U/L
GLUCOSE QUALITATIVE U: NEGATIVE MG/DL
GLUCOSE SERPL-MCNC: 90 MG/DL
HBA1C MFR BLD HPLC: 5.1 %
HCT VFR BLD CALC: 48.4 %
HDLC SERPL-MCNC: 50 MG/DL
HGB BLD-MCNC: 15.6 G/DL
IMM GRANULOCYTES NFR BLD AUTO: 0.2 %
KETONES URINE: ABNORMAL MG/DL
LDLC SERPL CALC-MCNC: 157 MG/DL
LEUKOCYTE ESTERASE URINE: ABNORMAL
LYMPHOCYTES # BLD AUTO: 2.41 K/UL
LYMPHOCYTES NFR BLD AUTO: 29.2 %
MAN DIFF?: NORMAL
MCHC RBC-ENTMCNC: 30.1 PG
MCHC RBC-ENTMCNC: 32.2 GM/DL
MCV RBC AUTO: 93.4 FL
MONOCYTES # BLD AUTO: 0.64 K/UL
MONOCYTES NFR BLD AUTO: 7.8 %
NEUTROPHILS # BLD AUTO: 4.42 K/UL
NEUTROPHILS NFR BLD AUTO: 53.7 %
NITRITE URINE: NEGATIVE
NONHDLC SERPL-MCNC: 191 MG/DL
PH URINE: 5.5
PLATELET # BLD AUTO: 285 K/UL
POTASSIUM SERPL-SCNC: 4.1 MMOL/L
PROT SERPL-MCNC: 6.9 G/DL
PROTEIN URINE: NEGATIVE MG/DL
RBC # BLD: 5.18 M/UL
RBC # FLD: 13.1 %
SODIUM SERPL-SCNC: 141 MMOL/L
SPECIFIC GRAVITY URINE: 1.02
TRIGL SERPL-MCNC: 188 MG/DL
TSH SERPL-ACNC: 1.23 UIU/ML
UROBILINOGEN URINE: 1 MG/DL
WBC # FLD AUTO: 8.24 K/UL

## 2024-08-02 LAB
PSA FREE FLD-MCNC: 23 %
PSA FREE SERPL-MCNC: 0.99 NG/ML
PSA SERPL-MCNC: 4.35 NG/ML

## 2024-09-05 ENCOUNTER — LABORATORY RESULT (OUTPATIENT)
Age: 84
End: 2024-09-05

## 2024-09-06 ENCOUNTER — APPOINTMENT (OUTPATIENT)
Dept: UROLOGY | Facility: CLINIC | Age: 84
End: 2024-09-06
Payer: MEDICARE

## 2024-09-06 VITALS
HEART RATE: 59 BPM | OXYGEN SATURATION: 98 % | TEMPERATURE: 97.4 F | SYSTOLIC BLOOD PRESSURE: 119 MMHG | WEIGHT: 156 LBS | DIASTOLIC BLOOD PRESSURE: 67 MMHG | RESPIRATION RATE: 16 BRPM | HEIGHT: 60 IN | BODY MASS INDEX: 30.63 KG/M2

## 2024-09-06 DIAGNOSIS — N40.1 BENIGN PROSTATIC HYPERPLASIA WITH LOWER URINARY TRACT SYMPMS: ICD-10-CM

## 2024-09-06 PROCEDURE — 99203 OFFICE O/P NEW LOW 30 MIN: CPT

## 2024-09-06 RX ORDER — DONEPEZIL HYDROCHLORIDE 10 MG/1
10 TABLET ORAL
Refills: 0 | Status: ACTIVE | COMMUNITY

## 2024-09-06 NOTE — ASSESSMENT
[FreeTextEntry1] : 82 y/o male here today for elevated PSA  Nocturia X2/3, hematuria, dysuria, flank pain, or any other urinary issues CALI  Denies eating spicy, citrus, chocolate. Not adequately hydrating    FHx of PCA: denies  Tobacco use: former smoker  Last PSA:  4.35 ng/mL (08/01/24)  Last creatinine: 1.10 mg/dL (07/31/24)  Last UCx: negative (07/31/24)  Uro Meds: Tamsulosin 0.4 mg  PSA Hx:  4.35 ng/mL (08/01/24)  3.82 ng/mL (09/20/23)  3.90 ng/mL (07/22/22)   Creatinine Hx:  1.10 mg/dL (07/31/24)  1.07 mg/dL (09/20/23)  1.09 mg/dL (07/22/22)   Blood draw for PSA Collecting urine for UA and Culture  Ordering TRUS to measure prostatic volume and evaluate possible Dutasteride treatment. F/U to discuss results

## 2024-09-06 NOTE — HISTORY OF PRESENT ILLNESS
[FreeTextEntry1] : 84 y/o male here today for elevated PSA  Nocturia X2/3, hematuria, dysuria, flank pain, or any other urinary issues CALI  Denies eating spicy, citrus, chocolate. Not adequately hydrating    FHx of PCA: denies  Tobacco use: former smoker  Last PSA:  4.35 ng/mL (08/01/24)  Last creatinine: 1.10 mg/dL (07/31/24)  Last UCx: negative (07/31/24)  Uro Meds: Tamsulosin 0.4 mg  PSA Hx:  4.35 ng/mL (08/01/24)  3.82 ng/mL (09/20/23)  3.90 ng/mL (07/22/22)   Creatinine Hx:  1.10 mg/dL (07/31/24)  1.07 mg/dL (09/20/23)  1.09 mg/dL (07/22/22)

## 2024-09-09 LAB
APPEARANCE: CLEAR
BILIRUBIN URINE: NEGATIVE
BLOOD URINE: NEGATIVE
COLOR: YELLOW
GLUCOSE QUALITATIVE U: NEGATIVE MG/DL
KETONES URINE: NEGATIVE MG/DL
LEUKOCYTE ESTERASE URINE: ABNORMAL
NITRITE URINE: NEGATIVE
PH URINE: 5.5
PROTEIN URINE: NEGATIVE MG/DL
PSA SERPL-MCNC: 4.51 NG/ML
SPECIFIC GRAVITY URINE: 1.02
UROBILINOGEN URINE: 0.2 MG/DL

## 2024-09-12 ENCOUNTER — APPOINTMENT (OUTPATIENT)
Dept: INTERNAL MEDICINE | Facility: CLINIC | Age: 84
End: 2024-09-12

## 2024-09-25 ENCOUNTER — NON-APPOINTMENT (OUTPATIENT)
Age: 84
End: 2024-09-25

## 2024-09-25 ENCOUNTER — APPOINTMENT (OUTPATIENT)
Dept: INTERNAL MEDICINE | Facility: CLINIC | Age: 84
End: 2024-09-25
Payer: MEDICARE

## 2024-09-25 VITALS
SYSTOLIC BLOOD PRESSURE: 124 MMHG | HEART RATE: 50 BPM | HEIGHT: 60 IN | WEIGHT: 157 LBS | RESPIRATION RATE: 17 BRPM | DIASTOLIC BLOOD PRESSURE: 78 MMHG | TEMPERATURE: 96.2 F | BODY MASS INDEX: 30.82 KG/M2 | OXYGEN SATURATION: 97 %

## 2024-09-25 DIAGNOSIS — E78.5 HYPERLIPIDEMIA, UNSPECIFIED: ICD-10-CM

## 2024-09-25 DIAGNOSIS — E55.9 VITAMIN D DEFICIENCY, UNSPECIFIED: ICD-10-CM

## 2024-09-25 DIAGNOSIS — Z23 ENCOUNTER FOR IMMUNIZATION: ICD-10-CM

## 2024-09-25 DIAGNOSIS — D33.0 BENIGN NEOPLASM OF BRAIN, SUPRATENTORIAL: ICD-10-CM

## 2024-09-25 DIAGNOSIS — Z71.89 OTHER SPECIFIED COUNSELING: ICD-10-CM

## 2024-09-25 DIAGNOSIS — E03.9 HYPOTHYROIDISM, UNSPECIFIED: ICD-10-CM

## 2024-09-25 DIAGNOSIS — Z13.39 ENCOUNTER FOR SCREENING EXAM FOR OTHER MENTAL HEALTH AND BEHAVIORAL DISORDERS: ICD-10-CM

## 2024-09-25 DIAGNOSIS — M17.0 BILATERAL PRIMARY OSTEOARTHRITIS OF KNEE: ICD-10-CM

## 2024-09-25 DIAGNOSIS — N40.1 BENIGN PROSTATIC HYPERPLASIA WITH LOWER URINARY TRACT SYMPMS: ICD-10-CM

## 2024-09-25 DIAGNOSIS — H91.90 UNSPECIFIED HEARING LOSS, UNSPECIFIED EAR: ICD-10-CM

## 2024-09-25 DIAGNOSIS — F31.9 BIPOLAR DISORDER, UNSPECIFIED: ICD-10-CM

## 2024-09-25 DIAGNOSIS — K21.9 GASTRO-ESOPHAGEAL REFLUX DISEASE W/OUT ESOPHAGITIS: ICD-10-CM

## 2024-09-25 DIAGNOSIS — Z13.31 ENCOUNTER FOR SCREENING FOR DEPRESSION: ICD-10-CM

## 2024-09-25 PROCEDURE — 99497 ADVNCD CARE PLAN 30 MIN: CPT | Mod: 25

## 2024-09-25 PROCEDURE — G0444 DEPRESSION SCREEN ANNUAL: CPT | Mod: 59

## 2024-09-25 PROCEDURE — 99215 OFFICE O/P EST HI 40 MIN: CPT

## 2024-09-25 PROCEDURE — G0008: CPT

## 2024-09-25 PROCEDURE — G2211 COMPLEX E/M VISIT ADD ON: CPT

## 2024-09-25 PROCEDURE — 90662 IIV NO PRSV INCREASED AG IM: CPT

## 2024-09-25 PROCEDURE — 93000 ELECTROCARDIOGRAM COMPLETE: CPT | Mod: 59

## 2024-09-25 NOTE — COUNSELING
[Fall prevention counseling provided] : Fall prevention counseling provided [Adequate lighting] : Adequate lighting [No throw rugs] : No throw rugs [Use proper foot wear] : Use proper foot wear [Use recommended devices] : Use recommended devices [Behavioral health counseling provided] : Behavioral health counseling provided [Sleep ___ hours/day] : Sleep [unfilled] hours/day [Engage in a relaxing activity] : Engage in a relaxing activity [Plan in advance] : Plan in advance [None] : None [Good understanding] : Patient has a good understanding of lifestyle changes and steps needed to achieve self management goal [de-identified] : Total face-to-face time with patient - 20 minutes; >50% involved counselling, review of labs/tests, and/or coordination of medical care:  Medical Annual wellness visit completed: HRA completed and reviewed with patient Medical, family, surgical history reviewed with patient and updated List of current providers r/w patient and updated Vitals, BMI reviewed and discussed along with healthy BMI goals. Dietary counseling x 15 minutes provided Depression PHQ 9 completed and reviewed  Annual safety assessment reviewed discussed advanced directives smoking cessation counseling provided Established routine screening and immunization schedules  VACCINATION & OTHER TX RECOMMENDATIONS  ASA preventative therapy Calcium/Vitamin D supplementation   Dietary counseling, nutrition referral risks vs. benefits d/w patient. routine vaccination and vaccination schedules and recommendation d/w patient  Vaccines recommended:  * pneumovax (once after 65) & prevnar * annual Influenza vaccine * Hep B vaccines * zostavax * Tdap  Colorectal screening recommended; screening colonoscopy q10yr, flex sig q5yr, annual fecal occult testing BMD recommended biennially for osteoporosis screening Glaucoma screening recommended, annual optho evals Cardiovascular screening and blood tests recommended and discussed w/ patient, cholesterol screening and dietary counseling AAA recommended x 1  diet and exercise weight loss.  Low-salt low-fat ADA diet/ htn- Discussed diabetes physiology - Discussed importance of monitoring blood glucose levels - Encouraged a low fat/low cholesterol diet - Discussed symptoms of hyperglycemia and hypoglycemia - Discussed ADA glucose goals - Discussed  HGB A1c and the effects of blood glucose on the level - Discussed Healthy eating, avoidance of concentrated sweets, and to include vegetables by at least 2 meals a day - Discussed regular exercise - Discussed importance of follow up physician visits Limit intake of Sodium (Salt) to less than 2 grams a day to prevent fluid retention-swelling or worsening of symptoms. The importance of keeping the blood pressure at or below 130/80 to prevent stroke, heart attacks, kidney failure, blindness, and loss of limbs was  low chol diet. Avoid fried foods, red meat, butter, eggs, hard cheeses. Use canola or olive oil preferred. ::  was established in which goals would be set, monitoring would be done, and problem solving would also be addressed. The patient would be assisted using behavior change techniques, such as self-help and counseling through behavioral modification: Problem solving using hypnosis and positive medical reinforcement to achieve agreed-upon goals.  Symptomatic patients : Test for influenza, if positive, treat for influenza and do not continue below.  1. Fever plus cough or shortness of breath : Test for RVP and COVID-19. 2.Indirect, circumstantial or unclear exposure to COVID-19, or other concerning cases not meeting above criteria: Please call [facility] to discuss testing.  +++ All above cases must be reported to the [facility] registry. +++  Asymptomatic patients:  1. Known first-degree direct-contact exposure to positive COVID-19 patient but asymptomatic: No testing PLUS 14 day self-quarantine. Pt to call if symptoms develop. Report to [facility] Registry. 2. No known exposure and asymptomatic, referred from outside healthcare organization: Please call [facility] to discuss testing.  3.All other asymptomatic patients with no known exposures: no testing, no exceptions.

## 2024-09-25 NOTE — REVIEW OF SYSTEMS
[Negative] : Heme/Lymph [Nocturia] : nocturia [Dizziness] : dizziness [Anxiety] : anxiety [Depression] : depression [de-identified] : Dizziness resolved [de-identified] : Appears to be stable now on Seroquel

## 2024-09-25 NOTE — END OF VISIT
[Time Spent: ___ minutes] : I have spent [unfilled] minutes of time on the encounter which excludes teaching and separately reported services. [FreeTextEntry4] : I Jenna Rosa, am scribing for and in the presence of Dr. Hicks, the following sections of:  HISTORY OF PRESENT ILLNESS, PAST MEDICAL/FAMILY/SOCIAL HISTORY, ROS, VITALS, PE, DISPOSITION

## 2024-09-25 NOTE — REASON FOR VISIT
[Annual Wellness Visit] : an annual wellness visit [FreeTextEntry1] : annual wellness for 84 y/o male patient

## 2024-09-25 NOTE — HISTORY OF PRESENT ILLNESS
[FreeTextEntry1] : annual wellness and a GHM for this 83-year-old male. Hx of lipids, htn, hypothyroid, BPH, dementia and deafness.  CC: was feeling sick overnight last Saturday and states that when he wakes up, he feels dizzy.  Patient was seen at urgent care center was treated for a URI.  Patient is no longer dizzy denies fever chills cough chest pain or shortness of breath.  Patient is under the care of urologist for elevated PSA.  Patient requesting flu shot [de-identified] : 83-year-old male presents himself today for an annual wellness, [facility]. Hx of lipids, htn, hypothyroid, BPH, dementia and deafness.  Patient is presently on diclofenac 100 mg for joint and muscle pains levothyroxine, hamulus Zosyn and Seroquel.  Patient denies dizziness at this time and states that the URI has subsided.  Voices no further complaints. ROS as documented below. Denies fever, cough, chills, body aches and SOB

## 2024-09-25 NOTE — HEALTH RISK ASSESSMENT
[Fair] :  ~his/her~ mood as fair [Yes] : Yes [No falls in past year] : Patient reported no falls in the past year [0] : 2) Feeling down, depressed, or hopeless: Not at all (0) [PHQ-2 Negative - No further assessment needed] : PHQ-2 Negative - No further assessment needed [I have developed a follow-up plan documented below in the note.] : I have developed a follow-up plan documented below in the note. [Time Spent: ___ Minutes] : I spent [unfilled] minutes performing a depression screening for this patient. [Never] : Never [None] : None [Alone] : lives alone [Feels Safe at Home] : Feels safe at home [Fully functional (bathing, dressing, toileting, transferring, walking, feeding)] : Fully functional (bathing, dressing, toileting, transferring, walking, feeding) [Fully functional (using the telephone, shopping, preparing meals, housekeeping, doing laundry, using] : Fully functional and needs no help or supervision to perform IADLs (using the telephone, shopping, preparing meals, housekeeping, doing laundry, using transportation, managing medications and managing finances) [Reports changes in hearing] : Reports changes in hearing [Reports normal functional visual acuity (ie: able to read med bottle)] : Reports normal functional visual acuity [Smoke Detector] : smoke detector [Carbon Monoxide Detector] : carbon monoxide detector [Safety elements used in home] : safety elements used in home [Seat Belt] :  uses seat belt [Sunscreen] : uses sunscreen [With Patient/Caregiver] : , with patient/caregiver [Reviewed no changes] : Reviewed, no changes [I will adhere to the patient's wishes.] : I will adhere to the patient's wishes. [Time Spent: ___ minutes] : Time Spent: [unfilled] minutes [NO] : No [] :  [# Of Children ___] : has [unfilled] children [de-identified] : Referred to urology [de-identified] : Drinks wine with meals [de-identified] : Minimal [de-identified] : Standard [place]ese [[id number]] : 0 [Change in mental status noted] : No change in mental status noted [Language] : denies difficulty with language [Behavior] : denies difficulty with behavior [Learning/Retaining New Information] : denies difficulty learning/retaining new information [Handling Complex Tasks] : denies difficulty handling complex tasks [Reasoning] : denies difficulty with reasoning [Spatial Ability and Orientation] : denies difficulty with spatial ability and orientation [Sexually Active] : not sexually active [Reports changes in vision] : Reports no changes in vision [Reports changes in dental health] : Reports no changes in dental health [TB Exposure] : is not being exposed to tuberculosis [Caregiver Concerns] : does not have caregiver concerns [ColonoscopyComments] : Not up-to-date [AdvancecareDate] : 09/24

## 2024-09-25 NOTE — PHYSICAL EXAM
[No Acute Distress] : no acute distress [Well Nourished] : well nourished [Well Developed] : well developed [Well-Appearing] : well-appearing [Normal Sclera/Conjunctiva] : normal sclera/conjunctiva [PERRL] : pupils equal round and reactive to light [EOMI] : extraocular movements intact [Normal Outer Ear/Nose] : the outer ears and nose were normal in appearance [Normal Oropharynx] : the oropharynx was normal [No JVD] : no jugular venous distention [No Lymphadenopathy] : no lymphadenopathy [Supple] : supple [Thyroid Normal, No Nodules] : the thyroid was normal and there were no nodules present [No Respiratory Distress] : no respiratory distress  [No Accessory Muscle Use] : no accessory muscle use [Clear to Auscultation] : lungs were clear to auscultation bilaterally [Normal Rate] : normal rate  [Regular Rhythm] : with a regular rhythm [Normal S1, S2] : normal S1 and S2 [No Murmur] : no murmur heard [No Carotid Bruits] : no carotid bruits [No Abdominal Bruit] : a ~M bruit was not heard ~T in the abdomen [No Varicosities] : no varicosities [Pedal Pulses Present] : the pedal pulses are present [No Edema] : there was no peripheral edema [No Palpable Aorta] : no palpable aorta [No Extremity Clubbing/Cyanosis] : no extremity clubbing/cyanosis [Soft] : abdomen soft [Non Tender] : non-tender [Non-distended] : non-distended [No Masses] : no abdominal mass palpated [No HSM] : no HSM [Normal Bowel Sounds] : normal bowel sounds [Normal Posterior Cervical Nodes] : no posterior cervical lymphadenopathy [Normal Anterior Cervical Nodes] : no anterior cervical lymphadenopathy [No CVA Tenderness] : no CVA  tenderness [No Spinal Tenderness] : no spinal tenderness [No Joint Swelling] : no joint swelling [Grossly Normal Strength/Tone] : grossly normal strength/tone [No Rash] : no rash [Coordination Grossly Intact] : coordination grossly intact [No Focal Deficits] : no focal deficits [Normal Gait] : normal gait [Deep Tendon Reflexes (DTR)] : deep tendon reflexes were 2+ and symmetric [Normal Affect] : the affect was normal [Normal Insight/Judgement] : insight and judgment were intact [Normal Appearance] : normal in appearance [Declined Rectal Exam] : declined rectal exam [Normal] : affect was normal and insight and judgment were intact [de-identified] : Deafness [de-identified] : Back and knee pain

## 2024-10-10 ENCOUNTER — RX RENEWAL (OUTPATIENT)
Age: 84
End: 2024-10-10

## 2024-10-14 ENCOUNTER — APPOINTMENT (OUTPATIENT)
Dept: UROLOGY | Facility: CLINIC | Age: 84
End: 2024-10-14
Payer: MEDICARE

## 2024-10-14 VITALS
HEART RATE: 54 BPM | OXYGEN SATURATION: 97 % | HEIGHT: 60 IN | TEMPERATURE: 98 F | RESPIRATION RATE: 16 BRPM | WEIGHT: 156 LBS | BODY MASS INDEX: 30.63 KG/M2 | SYSTOLIC BLOOD PRESSURE: 136 MMHG | DIASTOLIC BLOOD PRESSURE: 68 MMHG

## 2024-10-14 DIAGNOSIS — N40.1 BENIGN PROSTATIC HYPERPLASIA WITH LOWER URINARY TRACT SYMPMS: ICD-10-CM

## 2024-10-14 PROCEDURE — 76872 US TRANSRECTAL: CPT

## 2024-10-14 PROCEDURE — 99213 OFFICE O/P EST LOW 20 MIN: CPT

## 2024-10-14 RX ORDER — DUTASTERIDE 0.5 MG/1
0.5 CAPSULE, LIQUID FILLED ORAL
Qty: 180 | Refills: 0 | Status: ACTIVE | COMMUNITY
Start: 2024-10-14 | End: 1900-01-01

## 2024-10-28 ENCOUNTER — APPOINTMENT (OUTPATIENT)
Dept: INTERNAL MEDICINE | Facility: CLINIC | Age: 84
End: 2024-10-28

## 2024-10-28 VITALS
TEMPERATURE: 96.7 F | OXYGEN SATURATION: 97 % | HEART RATE: 61 BPM | RESPIRATION RATE: 16 BRPM | SYSTOLIC BLOOD PRESSURE: 118 MMHG | DIASTOLIC BLOOD PRESSURE: 68 MMHG | BODY MASS INDEX: 30.63 KG/M2 | WEIGHT: 156 LBS | HEIGHT: 60 IN

## 2024-10-28 DIAGNOSIS — K21.9 GASTRO-ESOPHAGEAL REFLUX DISEASE W/OUT ESOPHAGITIS: ICD-10-CM

## 2024-10-28 DIAGNOSIS — E78.5 HYPERLIPIDEMIA, UNSPECIFIED: ICD-10-CM

## 2024-10-28 DIAGNOSIS — J06.9 ACUTE UPPER RESPIRATORY INFECTION, UNSPECIFIED: ICD-10-CM

## 2024-10-28 DIAGNOSIS — F31.9 BIPOLAR DISORDER, UNSPECIFIED: ICD-10-CM

## 2024-10-28 DIAGNOSIS — E03.9 HYPOTHYROIDISM, UNSPECIFIED: ICD-10-CM

## 2024-10-28 DIAGNOSIS — M25.552 PAIN IN LEFT HIP: ICD-10-CM

## 2024-10-28 DIAGNOSIS — M79.2 NEURALGIA AND NEURITIS, UNSPECIFIED: ICD-10-CM

## 2024-10-28 DIAGNOSIS — E55.9 VITAMIN D DEFICIENCY, UNSPECIFIED: ICD-10-CM

## 2024-10-28 PROCEDURE — 99214 OFFICE O/P EST MOD 30 MIN: CPT

## 2024-10-28 PROCEDURE — 99401 PREV MED CNSL INDIV APPRX 15: CPT

## 2024-10-28 PROCEDURE — G2211 COMPLEX E/M VISIT ADD ON: CPT

## 2024-10-28 RX ORDER — KETOROLAC TROMETHAMINE 60 MG/2ML
60 INJECTION, SOLUTION INTRAMUSCULAR
Qty: 1 | Refills: 0 | Status: COMPLETED | OUTPATIENT
Start: 2024-10-28

## 2024-10-28 RX ADMIN — KETOROLAC TROMETHAMINE 2 MG/2ML: 60 INJECTION, SOLUTION INTRAMUSCULAR at 00:00

## 2024-10-29 PROBLEM — M25.552 HIP PAIN, LEFT: Status: ACTIVE | Noted: 2024-10-28

## 2024-10-29 PROBLEM — M79.2: Status: ACTIVE | Noted: 2024-10-29

## 2024-11-29 ENCOUNTER — APPOINTMENT (OUTPATIENT)
Dept: INTERNAL MEDICINE | Facility: CLINIC | Age: 84
End: 2024-11-29
Payer: MEDICARE

## 2024-11-29 VITALS
HEART RATE: 71 BPM | SYSTOLIC BLOOD PRESSURE: 122 MMHG | BODY MASS INDEX: 30.04 KG/M2 | RESPIRATION RATE: 16 BRPM | DIASTOLIC BLOOD PRESSURE: 76 MMHG | WEIGHT: 153 LBS | OXYGEN SATURATION: 96 % | TEMPERATURE: 96.9 F | HEIGHT: 60 IN

## 2024-11-29 DIAGNOSIS — F31.9 BIPOLAR DISORDER, UNSPECIFIED: ICD-10-CM

## 2024-11-29 DIAGNOSIS — D33.2 BENIGN NEOPLASM OF BRAIN, UNSPECIFIED: ICD-10-CM

## 2024-11-29 DIAGNOSIS — Z01.818 ENCOUNTER FOR OTHER PREPROCEDURAL EXAMINATION: ICD-10-CM

## 2024-11-29 DIAGNOSIS — Z00.00 ENCOUNTER FOR GENERAL ADULT MEDICAL EXAMINATION W/OUT ABNORMAL FINDINGS: ICD-10-CM

## 2024-11-29 DIAGNOSIS — E03.9 HYPOTHYROIDISM, UNSPECIFIED: ICD-10-CM

## 2024-11-29 DIAGNOSIS — E78.5 HYPERLIPIDEMIA, UNSPECIFIED: ICD-10-CM

## 2024-11-29 DIAGNOSIS — E55.9 VITAMIN D DEFICIENCY, UNSPECIFIED: ICD-10-CM

## 2024-11-29 DIAGNOSIS — H25.093 OTHER AGE-RELATED INCIPIENT CATARACT, BILATERAL: ICD-10-CM

## 2024-11-29 PROCEDURE — 99214 OFFICE O/P EST MOD 30 MIN: CPT

## 2024-11-29 PROCEDURE — G2211 COMPLEX E/M VISIT ADD ON: CPT

## 2025-01-06 ENCOUNTER — APPOINTMENT (OUTPATIENT)
Dept: INTERNAL MEDICINE | Facility: CLINIC | Age: 85
End: 2025-01-06
Payer: MEDICARE

## 2025-01-06 VITALS
BODY MASS INDEX: 30.04 KG/M2 | TEMPERATURE: 96.9 F | OXYGEN SATURATION: 96 % | HEART RATE: 66 BPM | HEIGHT: 60 IN | RESPIRATION RATE: 16 BRPM | DIASTOLIC BLOOD PRESSURE: 80 MMHG | WEIGHT: 153 LBS | SYSTOLIC BLOOD PRESSURE: 130 MMHG

## 2025-01-06 DIAGNOSIS — F31.9 BIPOLAR DISORDER, UNSPECIFIED: ICD-10-CM

## 2025-01-06 DIAGNOSIS — M17.0 BILATERAL PRIMARY OSTEOARTHRITIS OF KNEE: ICD-10-CM

## 2025-01-06 DIAGNOSIS — D33.0 BENIGN NEOPLASM OF BRAIN, SUPRATENTORIAL: ICD-10-CM

## 2025-01-06 DIAGNOSIS — D33.2 BENIGN NEOPLASM OF BRAIN, UNSPECIFIED: ICD-10-CM

## 2025-01-06 PROCEDURE — 99214 OFFICE O/P EST MOD 30 MIN: CPT

## 2025-01-06 PROCEDURE — 99496 TRANSJ CARE MGMT HIGH F2F 7D: CPT

## 2025-01-07 PROBLEM — Z78.9 INPATIENT HOSPITALIZATION WITHIN LAST 30 DAYS: Status: ACTIVE | Noted: 2025-01-07

## 2025-01-07 PROBLEM — J18.9 PNEUMONIA: Status: ACTIVE | Noted: 2025-01-06

## 2025-01-15 ENCOUNTER — APPOINTMENT (OUTPATIENT)
Dept: INTERNAL MEDICINE | Facility: CLINIC | Age: 85
End: 2025-01-15
Payer: MEDICARE

## 2025-01-15 VITALS
HEIGHT: 60 IN | WEIGHT: 156 LBS | BODY MASS INDEX: 30.63 KG/M2 | DIASTOLIC BLOOD PRESSURE: 72 MMHG | RESPIRATION RATE: 18 BRPM | SYSTOLIC BLOOD PRESSURE: 126 MMHG | OXYGEN SATURATION: 96 % | HEART RATE: 60 BPM | TEMPERATURE: 97.3 F

## 2025-01-15 DIAGNOSIS — J18.9 PNEUMONIA, UNSPECIFIED ORGANISM: ICD-10-CM

## 2025-01-15 DIAGNOSIS — K21.9 GASTRO-ESOPHAGEAL REFLUX DISEASE W/OUT ESOPHAGITIS: ICD-10-CM

## 2025-01-15 DIAGNOSIS — Z78.9 OTHER SPECIFIED HEALTH STATUS: ICD-10-CM

## 2025-01-15 DIAGNOSIS — E03.9 HYPOTHYROIDISM, UNSPECIFIED: ICD-10-CM

## 2025-01-15 DIAGNOSIS — E78.5 HYPERLIPIDEMIA, UNSPECIFIED: ICD-10-CM

## 2025-01-15 PROCEDURE — 99214 OFFICE O/P EST MOD 30 MIN: CPT

## 2025-01-15 PROCEDURE — G2211 COMPLEX E/M VISIT ADD ON: CPT

## 2025-01-17 ENCOUNTER — NON-APPOINTMENT (OUTPATIENT)
Age: 85
End: 2025-01-17

## 2025-04-05 ENCOUNTER — RX RENEWAL (OUTPATIENT)
Age: 85
End: 2025-04-05

## 2025-04-08 ENCOUNTER — RX RENEWAL (OUTPATIENT)
Age: 85
End: 2025-04-08

## 2025-04-28 ENCOUNTER — NON-APPOINTMENT (OUTPATIENT)
Age: 85
End: 2025-04-28

## 2025-05-02 ENCOUNTER — APPOINTMENT (OUTPATIENT)
Dept: INTERNAL MEDICINE | Facility: CLINIC | Age: 85
End: 2025-05-02

## 2025-05-02 VITALS
SYSTOLIC BLOOD PRESSURE: 124 MMHG | WEIGHT: 158 LBS | HEIGHT: 60 IN | TEMPERATURE: 96.7 F | HEART RATE: 56 BPM | RESPIRATION RATE: 17 BRPM | DIASTOLIC BLOOD PRESSURE: 68 MMHG | OXYGEN SATURATION: 97 % | BODY MASS INDEX: 31.02 KG/M2

## 2025-05-02 DIAGNOSIS — K21.9 GASTRO-ESOPHAGEAL REFLUX DISEASE W/OUT ESOPHAGITIS: ICD-10-CM

## 2025-05-02 DIAGNOSIS — F31.9 BIPOLAR DISORDER, UNSPECIFIED: ICD-10-CM

## 2025-05-02 DIAGNOSIS — E78.5 HYPERLIPIDEMIA, UNSPECIFIED: ICD-10-CM

## 2025-05-02 DIAGNOSIS — M17.0 BILATERAL PRIMARY OSTEOARTHRITIS OF KNEE: ICD-10-CM

## 2025-05-02 DIAGNOSIS — E55.9 VITAMIN D DEFICIENCY, UNSPECIFIED: ICD-10-CM

## 2025-05-02 DIAGNOSIS — D33.0 BENIGN NEOPLASM OF BRAIN, SUPRATENTORIAL: ICD-10-CM

## 2025-05-02 DIAGNOSIS — N40.1 BENIGN PROSTATIC HYPERPLASIA WITH LOWER URINARY TRACT SYMPMS: ICD-10-CM

## 2025-05-02 DIAGNOSIS — E03.9 HYPOTHYROIDISM, UNSPECIFIED: ICD-10-CM

## 2025-05-02 DIAGNOSIS — F03.90 UNSPECIFIED DEMENTIA W/OUT BEHAVIORAL DISTURBANCE: ICD-10-CM

## 2025-05-02 DIAGNOSIS — H25.093 OTHER AGE-RELATED INCIPIENT CATARACT, BILATERAL: ICD-10-CM

## 2025-05-02 DIAGNOSIS — J18.9 PNEUMONIA, UNSPECIFIED ORGANISM: ICD-10-CM

## 2025-05-02 PROCEDURE — 99214 OFFICE O/P EST MOD 30 MIN: CPT

## 2025-05-02 PROCEDURE — G2211 COMPLEX E/M VISIT ADD ON: CPT

## 2025-06-02 ENCOUNTER — APPOINTMENT (OUTPATIENT)
Dept: UROLOGY | Facility: CLINIC | Age: 85
End: 2025-06-02

## 2025-06-02 ENCOUNTER — APPOINTMENT (OUTPATIENT)
Dept: UROLOGY | Facility: CLINIC | Age: 85
End: 2025-06-02
Payer: MEDICARE

## 2025-06-02 VITALS
DIASTOLIC BLOOD PRESSURE: 82 MMHG | HEART RATE: 48 BPM | BODY MASS INDEX: 30.63 KG/M2 | WEIGHT: 156 LBS | OXYGEN SATURATION: 98 % | SYSTOLIC BLOOD PRESSURE: 159 MMHG | HEIGHT: 60 IN

## 2025-06-02 PROCEDURE — 99213 OFFICE O/P EST LOW 20 MIN: CPT

## 2025-06-03 LAB
ALBUMIN SERPL ELPH-MCNC: 4 G/DL
ANION GAP SERPL CALC-SCNC: 17 MMOL/L
APPEARANCE: CLEAR
BACTERIA: NEGATIVE /HPF
BILIRUBIN URINE: NEGATIVE
BLOOD URINE: NEGATIVE
BUN SERPL-MCNC: 15 MG/DL
CALCIUM SERPL-MCNC: 8.9 MG/DL
CAST: 0 /LPF
CHLORIDE SERPL-SCNC: 105 MMOL/L
CO2 SERPL-SCNC: 16 MMOL/L
COLOR: YELLOW
CREAT SERPL-MCNC: 0.94 MG/DL
EGFRCR SERPLBLD CKD-EPI 2021: 79 ML/MIN/1.73M2
EPITHELIAL CELLS: 0 /HPF
GLUCOSE QUALITATIVE U: NEGATIVE MG/DL
GLUCOSE SERPL-MCNC: 61 MG/DL
KETONES URINE: ABNORMAL MG/DL
LEUKOCYTE ESTERASE URINE: NEGATIVE
MICROSCOPIC-UA: NORMAL
NITRITE URINE: NEGATIVE
PH URINE: 6
PHOSPHATE SERPL-MCNC: 3.6 MG/DL
POTASSIUM SERPL-SCNC: 5.3 MMOL/L
PROTEIN URINE: NEGATIVE MG/DL
PSA SERPL-MCNC: 2.54 NG/ML
RED BLOOD CELLS URINE: 1 /HPF
SODIUM SERPL-SCNC: 138 MMOL/L
SPECIFIC GRAVITY URINE: 1.02
UROBILINOGEN URINE: 0.2 MG/DL
WHITE BLOOD CELLS URINE: 0 /HPF

## 2025-06-05 LAB — BACTERIA UR CULT: NORMAL

## 2025-06-13 ENCOUNTER — APPOINTMENT (OUTPATIENT)
Dept: INTERNAL MEDICINE | Facility: CLINIC | Age: 85
End: 2025-06-13

## 2025-06-13 VITALS
HEIGHT: 60 IN | OXYGEN SATURATION: 98 % | TEMPERATURE: 98.4 F | WEIGHT: 156 LBS | RESPIRATION RATE: 18 BRPM | HEART RATE: 60 BPM | BODY MASS INDEX: 30.63 KG/M2 | DIASTOLIC BLOOD PRESSURE: 84 MMHG | SYSTOLIC BLOOD PRESSURE: 143 MMHG

## 2025-06-13 LAB
FLUAV SPEC QL CULT: NEGATIVE
FLUBV AG SPEC QL IA: NEGATIVE
S PYO AG SPEC QL IA: NEGATIVE
SARS-COV-2 AG RESP QL IA.RAPID: NEGATIVE

## 2025-06-13 PROCEDURE — 87811 SARS-COV-2 COVID19 W/OPTIC: CPT | Mod: QW

## 2025-06-13 PROCEDURE — 99214 OFFICE O/P EST MOD 30 MIN: CPT | Mod: 25

## 2025-06-13 PROCEDURE — 87804 INFLUENZA ASSAY W/OPTIC: CPT | Mod: QW

## 2025-06-13 PROCEDURE — 87880 STREP A ASSAY W/OPTIC: CPT | Mod: QW

## 2025-06-15 PROBLEM — J30.2 SEASONAL ALLERGIES: Status: ACTIVE | Noted: 2025-06-15

## 2025-07-03 ENCOUNTER — RX RENEWAL (OUTPATIENT)
Age: 85
End: 2025-07-03

## 2025-07-30 ENCOUNTER — RX RENEWAL (OUTPATIENT)
Age: 85
End: 2025-07-30

## 2025-08-06 ENCOUNTER — APPOINTMENT (OUTPATIENT)
Dept: INTERNAL MEDICINE | Facility: CLINIC | Age: 85
End: 2025-08-06
Payer: MEDICARE

## 2025-08-06 VITALS
SYSTOLIC BLOOD PRESSURE: 98 MMHG | WEIGHT: 154 LBS | HEIGHT: 60 IN | TEMPERATURE: 97.8 F | DIASTOLIC BLOOD PRESSURE: 70 MMHG | RESPIRATION RATE: 17 BRPM | HEART RATE: 63 BPM | OXYGEN SATURATION: 98 % | BODY MASS INDEX: 30.23 KG/M2

## 2025-08-06 DIAGNOSIS — H25.093 OTHER AGE-RELATED INCIPIENT CATARACT, BILATERAL: ICD-10-CM

## 2025-08-06 DIAGNOSIS — F03.90 UNSPECIFIED DEMENTIA W/OUT BEHAVIORAL DISTURBANCE: ICD-10-CM

## 2025-08-06 DIAGNOSIS — Z01.818 ENCOUNTER FOR OTHER PREPROCEDURAL EXAMINATION: ICD-10-CM

## 2025-08-06 DIAGNOSIS — N40.1 BENIGN PROSTATIC HYPERPLASIA WITH LOWER URINARY TRACT SYMPMS: ICD-10-CM

## 2025-08-06 DIAGNOSIS — M25.552 PAIN IN LEFT HIP: ICD-10-CM

## 2025-08-06 PROCEDURE — G2211 COMPLEX E/M VISIT ADD ON: CPT

## 2025-08-06 PROCEDURE — 99213 OFFICE O/P EST LOW 20 MIN: CPT

## 2025-09-19 ENCOUNTER — RX RENEWAL (OUTPATIENT)
Age: 85
End: 2025-09-19